# Patient Record
Sex: FEMALE | Race: WHITE | NOT HISPANIC OR LATINO | Employment: OTHER | URBAN - METROPOLITAN AREA
[De-identification: names, ages, dates, MRNs, and addresses within clinical notes are randomized per-mention and may not be internally consistent; named-entity substitution may affect disease eponyms.]

---

## 2017-05-20 ENCOUNTER — COMMUNICATION - HEALTHEAST (OUTPATIENT)
Dept: FAMILY MEDICINE | Facility: CLINIC | Age: 57
End: 2017-05-20

## 2018-01-23 ENCOUNTER — OFFICE VISIT - HEALTHEAST (OUTPATIENT)
Dept: FAMILY MEDICINE | Facility: CLINIC | Age: 58
End: 2018-01-23

## 2018-01-23 DIAGNOSIS — M85.80 OSTEOPENIA: ICD-10-CM

## 2018-01-23 DIAGNOSIS — R00.2 HEART PALPITATIONS: ICD-10-CM

## 2018-01-23 DIAGNOSIS — E78.5 HYPERLIPIDEMIA, UNSPECIFIED HYPERLIPIDEMIA TYPE: ICD-10-CM

## 2018-01-23 DIAGNOSIS — E55.9 VITAMIN D DEFICIENCY: ICD-10-CM

## 2018-01-23 DIAGNOSIS — Z00.00 ROUTINE GENERAL MEDICAL EXAMINATION AT A HEALTH CARE FACILITY: ICD-10-CM

## 2018-01-23 DIAGNOSIS — J45.20 MILD INTERMITTENT ASTHMA WITHOUT COMPLICATION: ICD-10-CM

## 2018-01-23 DIAGNOSIS — N95.2 ATROPHIC VAGINITIS: ICD-10-CM

## 2018-01-23 LAB
ALBUMIN SERPL-MCNC: 3.8 G/DL (ref 3.5–5)
ANION GAP SERPL CALCULATED.3IONS-SCNC: 8 MMOL/L (ref 5–18)
ATRIAL RATE - MUSE: 56 BPM
BUN SERPL-MCNC: 14 MG/DL (ref 8–22)
CALCIUM SERPL-MCNC: 9.5 MG/DL (ref 8.5–10.5)
CHLORIDE BLD-SCNC: 103 MMOL/L (ref 98–107)
CHOLEST SERPL-MCNC: 242 MG/DL
CO2 SERPL-SCNC: 29 MMOL/L (ref 22–31)
CREAT SERPL-MCNC: 0.77 MG/DL (ref 0.6–1.1)
DIASTOLIC BLOOD PRESSURE - MUSE: NORMAL MMHG
FASTING STATUS PATIENT QL REPORTED: YES
GFR SERPL CREATININE-BSD FRML MDRD: >60 ML/MIN/1.73M2
GLUCOSE BLD-MCNC: 97 MG/DL (ref 70–125)
HDLC SERPL-MCNC: 77 MG/DL
INTERPRETATION ECG - MUSE: NORMAL
LDLC SERPL CALC-MCNC: 148 MG/DL
MAGNESIUM SERPL-MCNC: 2.3 MG/DL (ref 1.8–2.6)
P AXIS - MUSE: 30 DEGREES
PHOSPHATE SERPL-MCNC: 3.4 MG/DL (ref 2.5–4.5)
POTASSIUM BLD-SCNC: 4.4 MMOL/L (ref 3.5–5)
PR INTERVAL - MUSE: 120 MS
QRS DURATION - MUSE: 72 MS
QT - MUSE: 452 MS
QTC - MUSE: 436 MS
R AXIS - MUSE: 35 DEGREES
SODIUM SERPL-SCNC: 140 MMOL/L (ref 136–145)
SYSTOLIC BLOOD PRESSURE - MUSE: NORMAL MMHG
T AXIS - MUSE: 8 DEGREES
TRIGL SERPL-MCNC: 86 MG/DL
TSH SERPL DL<=0.005 MIU/L-ACNC: 1.46 UIU/ML (ref 0.3–5)
VENTRICULAR RATE- MUSE: 56 BPM

## 2018-01-23 ASSESSMENT — MIFFLIN-ST. JEOR: SCORE: 1074.01

## 2018-01-24 LAB — 25(OH)D3 SERPL-MCNC: 33 NG/ML (ref 30–80)

## 2018-01-25 ENCOUNTER — COMMUNICATION - HEALTHEAST (OUTPATIENT)
Dept: FAMILY MEDICINE | Facility: CLINIC | Age: 58
End: 2018-01-25

## 2018-01-30 ENCOUNTER — HOSPITAL ENCOUNTER (OUTPATIENT)
Dept: CARDIOLOGY | Facility: CLINIC | Age: 58
Discharge: HOME | End: 2018-01-30
Attending: FAMILY MEDICINE

## 2018-01-30 DIAGNOSIS — R00.2 HEART PALPITATIONS: ICD-10-CM

## 2018-02-02 ENCOUNTER — COMMUNICATION - HEALTHEAST (OUTPATIENT)
Dept: FAMILY MEDICINE | Facility: CLINIC | Age: 58
End: 2018-02-02

## 2018-02-07 ENCOUNTER — RECORDS - HEALTHEAST (OUTPATIENT)
Dept: ADMINISTRATIVE | Facility: OTHER | Age: 58
End: 2018-02-07

## 2018-02-07 ENCOUNTER — RECORDS - HEALTHEAST (OUTPATIENT)
Dept: BONE DENSITY | Facility: CLINIC | Age: 58
End: 2018-02-07

## 2018-02-07 DIAGNOSIS — M85.80 OTHER SPECIFIED DISORDERS OF BONE DENSITY AND STRUCTURE, UNSPECIFIED SITE: ICD-10-CM

## 2018-02-09 ENCOUNTER — HOSPITAL ENCOUNTER (OUTPATIENT)
Dept: MAMMOGRAPHY | Facility: CLINIC | Age: 58
Discharge: HOME | End: 2018-02-09
Attending: FAMILY MEDICINE

## 2018-02-09 DIAGNOSIS — Z12.31 VISIT FOR SCREENING MAMMOGRAM: ICD-10-CM

## 2018-02-12 ENCOUNTER — COMMUNICATION - HEALTHEAST (OUTPATIENT)
Dept: MAMMOGRAPHY | Facility: CLINIC | Age: 58
End: 2018-02-12

## 2018-02-14 ENCOUNTER — HOSPITAL ENCOUNTER (OUTPATIENT)
Dept: ULTRASOUND IMAGING | Facility: CLINIC | Age: 58
Discharge: HOME | End: 2018-02-14
Attending: FAMILY MEDICINE

## 2018-02-14 ENCOUNTER — HOSPITAL ENCOUNTER (OUTPATIENT)
Dept: MAMMOGRAPHY | Facility: CLINIC | Age: 58
Discharge: HOME | End: 2018-02-14
Attending: FAMILY MEDICINE

## 2018-02-14 DIAGNOSIS — N64.89 BREAST ASYMMETRY: ICD-10-CM

## 2018-02-21 ENCOUNTER — COMMUNICATION - HEALTHEAST (OUTPATIENT)
Dept: FAMILY MEDICINE | Facility: CLINIC | Age: 58
End: 2018-02-21

## 2018-06-28 ENCOUNTER — COMMUNICATION - HEALTHEAST (OUTPATIENT)
Dept: FAMILY MEDICINE | Facility: CLINIC | Age: 58
End: 2018-06-28

## 2018-07-17 ENCOUNTER — OFFICE VISIT - HEALTHEAST (OUTPATIENT)
Dept: FAMILY MEDICINE | Facility: CLINIC | Age: 58
End: 2018-07-17

## 2018-07-17 DIAGNOSIS — L98.9 SKIN LESION: ICD-10-CM

## 2018-07-17 DIAGNOSIS — M65.30 TRIGGER FINGER, ACQUIRED: ICD-10-CM

## 2018-07-17 DIAGNOSIS — R22.9 LOCALIZED SUPERFICIAL SWELLING, MASS, OR LUMP: ICD-10-CM

## 2018-07-17 DIAGNOSIS — N95.2 ATROPHIC VAGINITIS: ICD-10-CM

## 2018-07-17 ASSESSMENT — MIFFLIN-ST. JEOR: SCORE: 1074.01

## 2018-07-19 ENCOUNTER — HOSPITAL ENCOUNTER (OUTPATIENT)
Dept: ULTRASOUND IMAGING | Facility: CLINIC | Age: 58
Discharge: HOME | End: 2018-07-19
Attending: FAMILY MEDICINE

## 2018-07-19 DIAGNOSIS — R22.9 LOCALIZED SUPERFICIAL SWELLING, MASS, OR LUMP: ICD-10-CM

## 2018-08-17 ENCOUNTER — COMMUNICATION - HEALTHEAST (OUTPATIENT)
Dept: FAMILY MEDICINE | Facility: CLINIC | Age: 58
End: 2018-08-17

## 2018-08-17 DIAGNOSIS — M65.342 TRIGGER RING FINGER OF LEFT HAND: ICD-10-CM

## 2018-08-21 ENCOUNTER — COMMUNICATION - HEALTHEAST (OUTPATIENT)
Dept: FAMILY MEDICINE | Facility: CLINIC | Age: 58
End: 2018-08-21

## 2018-08-21 DIAGNOSIS — M25.50 MULTIPLE JOINT PAIN: ICD-10-CM

## 2018-08-27 ENCOUNTER — COMMUNICATION - HEALTHEAST (OUTPATIENT)
Dept: FAMILY MEDICINE | Facility: CLINIC | Age: 58
End: 2018-08-27

## 2018-08-30 ENCOUNTER — RECORDS - HEALTHEAST (OUTPATIENT)
Dept: ADMINISTRATIVE | Facility: OTHER | Age: 58
End: 2018-08-30

## 2018-09-13 ENCOUNTER — RECORDS - HEALTHEAST (OUTPATIENT)
Dept: ADMINISTRATIVE | Facility: OTHER | Age: 58
End: 2018-09-13

## 2018-09-19 ENCOUNTER — RECORDS - HEALTHEAST (OUTPATIENT)
Dept: ADMINISTRATIVE | Facility: OTHER | Age: 58
End: 2018-09-19

## 2018-09-26 ENCOUNTER — RECORDS - HEALTHEAST (OUTPATIENT)
Dept: ADMINISTRATIVE | Facility: OTHER | Age: 58
End: 2018-09-26

## 2018-10-04 ENCOUNTER — OFFICE VISIT - HEALTHEAST (OUTPATIENT)
Dept: RHEUMATOLOGY | Facility: CLINIC | Age: 58
End: 2018-10-04

## 2018-10-04 DIAGNOSIS — M19.041 PRIMARY OSTEOARTHRITIS OF HANDS, BILATERAL: ICD-10-CM

## 2018-10-04 DIAGNOSIS — M25.50 POLYARTHRALGIA: ICD-10-CM

## 2018-10-04 DIAGNOSIS — M19.042 PRIMARY OSTEOARTHRITIS OF HANDS, BILATERAL: ICD-10-CM

## 2018-10-04 ASSESSMENT — MIFFLIN-ST. JEOR: SCORE: 1069.02

## 2018-10-08 ENCOUNTER — OFFICE VISIT - HEALTHEAST (OUTPATIENT)
Dept: FAMILY MEDICINE | Facility: CLINIC | Age: 58
End: 2018-10-08

## 2018-10-08 DIAGNOSIS — Z01.818 PREOPERATIVE EXAMINATION: ICD-10-CM

## 2018-10-08 DIAGNOSIS — M79.672 FOOT PAIN, LEFT: ICD-10-CM

## 2018-10-08 LAB
ATRIAL RATE - MUSE: 73 BPM
DIASTOLIC BLOOD PRESSURE - MUSE: NORMAL MMHG
HGB BLD-MCNC: 14.2 G/DL (ref 12–16)
INTERPRETATION ECG - MUSE: NORMAL
P AXIS - MUSE: 40 DEGREES
PR INTERVAL - MUSE: 118 MS
QRS DURATION - MUSE: 64 MS
QT - MUSE: 390 MS
QTC - MUSE: 429 MS
R AXIS - MUSE: 25 DEGREES
SYSTOLIC BLOOD PRESSURE - MUSE: NORMAL MMHG
T AXIS - MUSE: 8 DEGREES
VENTRICULAR RATE- MUSE: 73 BPM

## 2018-10-08 ASSESSMENT — MIFFLIN-ST. JEOR: SCORE: 1060.97

## 2018-10-16 ENCOUNTER — RECORDS - HEALTHEAST (OUTPATIENT)
Dept: ADMINISTRATIVE | Facility: OTHER | Age: 58
End: 2018-10-16

## 2018-10-23 ENCOUNTER — RECORDS - HEALTHEAST (OUTPATIENT)
Dept: ADMINISTRATIVE | Facility: OTHER | Age: 58
End: 2018-10-23

## 2018-11-26 ENCOUNTER — RECORDS - HEALTHEAST (OUTPATIENT)
Dept: ADMINISTRATIVE | Facility: OTHER | Age: 58
End: 2018-11-26

## 2018-12-10 ENCOUNTER — OFFICE VISIT - HEALTHEAST (OUTPATIENT)
Dept: FAMILY MEDICINE | Facility: CLINIC | Age: 58
End: 2018-12-10

## 2018-12-10 DIAGNOSIS — M79.672 LEFT FOOT PAIN: ICD-10-CM

## 2018-12-10 DIAGNOSIS — M65.342 TRIGGER RING FINGER OF LEFT HAND: ICD-10-CM

## 2018-12-10 DIAGNOSIS — Z01.818 ENCOUNTER FOR PREOPERATIVE EXAMINATION FOR GENERAL SURGICAL PROCEDURE: ICD-10-CM

## 2018-12-17 ENCOUNTER — RECORDS - HEALTHEAST (OUTPATIENT)
Dept: ADMINISTRATIVE | Facility: OTHER | Age: 58
End: 2018-12-17

## 2018-12-20 ENCOUNTER — RECORDS - HEALTHEAST (OUTPATIENT)
Dept: ADMINISTRATIVE | Facility: OTHER | Age: 58
End: 2018-12-20

## 2018-12-21 ENCOUNTER — RECORDS - HEALTHEAST (OUTPATIENT)
Dept: ADMINISTRATIVE | Facility: OTHER | Age: 58
End: 2018-12-21

## 2018-12-28 ENCOUNTER — RECORDS - HEALTHEAST (OUTPATIENT)
Dept: ADMINISTRATIVE | Facility: OTHER | Age: 58
End: 2018-12-28

## 2019-01-25 ENCOUNTER — RECORDS - HEALTHEAST (OUTPATIENT)
Dept: ADMINISTRATIVE | Facility: OTHER | Age: 59
End: 2019-01-25

## 2019-01-29 ENCOUNTER — OFFICE VISIT - HEALTHEAST (OUTPATIENT)
Dept: FAMILY MEDICINE | Facility: CLINIC | Age: 59
End: 2019-01-29

## 2019-01-29 ENCOUNTER — COMMUNICATION - HEALTHEAST (OUTPATIENT)
Dept: FAMILY MEDICINE | Facility: CLINIC | Age: 59
End: 2019-01-29

## 2019-01-29 DIAGNOSIS — J45.20 MILD INTERMITTENT ASTHMA WITHOUT COMPLICATION: ICD-10-CM

## 2019-01-29 DIAGNOSIS — Z01.818 PREOP GENERAL PHYSICAL EXAM: ICD-10-CM

## 2019-01-29 DIAGNOSIS — C44.91 SKIN CANCER, BASAL CELL: ICD-10-CM

## 2019-01-29 ASSESSMENT — MIFFLIN-ST. JEOR: SCORE: 1074.58

## 2019-02-17 ENCOUNTER — RECORDS - HEALTHEAST (OUTPATIENT)
Dept: ADMINISTRATIVE | Facility: OTHER | Age: 59
End: 2019-02-17

## 2019-02-25 ENCOUNTER — OFFICE VISIT - HEALTHEAST (OUTPATIENT)
Dept: FAMILY MEDICINE | Facility: CLINIC | Age: 59
End: 2019-02-25

## 2019-02-25 DIAGNOSIS — Z00.00 ROUTINE GENERAL MEDICAL EXAMINATION AT A HEALTH CARE FACILITY: ICD-10-CM

## 2019-02-25 DIAGNOSIS — N95.2 ATROPHIC VAGINITIS: ICD-10-CM

## 2019-02-25 DIAGNOSIS — M85.80 OSTEOPENIA, UNSPECIFIED LOCATION: ICD-10-CM

## 2019-02-25 DIAGNOSIS — J45.20 MILD INTERMITTENT ASTHMA WITHOUT COMPLICATION: ICD-10-CM

## 2019-02-25 DIAGNOSIS — E78.5 HYPERLIPIDEMIA, UNSPECIFIED HYPERLIPIDEMIA TYPE: ICD-10-CM

## 2019-02-25 DIAGNOSIS — Z12.4 SCREENING FOR MALIGNANT NEOPLASM OF CERVIX: ICD-10-CM

## 2019-02-25 DIAGNOSIS — M70.62 TROCHANTERIC BURSITIS OF LEFT HIP: ICD-10-CM

## 2019-02-25 LAB
CHOLEST SERPL-MCNC: 213 MG/DL
FASTING STATUS PATIENT QL REPORTED: YES
HDLC SERPL-MCNC: 66 MG/DL
LDLC SERPL CALC-MCNC: 134 MG/DL
TRIGL SERPL-MCNC: 67 MG/DL

## 2019-02-25 ASSESSMENT — MIFFLIN-ST. JEOR: SCORE: 1065.51

## 2019-02-26 LAB
HPV SOURCE: NORMAL
HUMAN PAPILLOMA VIRUS 16 DNA: NEGATIVE
HUMAN PAPILLOMA VIRUS 18 DNA: NEGATIVE
HUMAN PAPILLOMA VIRUS FINAL DIAGNOSIS: NORMAL
HUMAN PAPILLOMA VIRUS OTHER HR: NEGATIVE
SPECIMEN DESCRIPTION: NORMAL

## 2019-03-01 LAB
BKR LAB AP ABNORMAL BLEEDING: NO
BKR LAB AP BIRTH CONTROL/HORMONES: NORMAL
BKR LAB AP CERVICAL APPEARANCE: NORMAL
BKR LAB AP GYN ADEQUACY: NORMAL
BKR LAB AP GYN INTERPRETATION: NORMAL
BKR LAB AP HPV REFLEX: NORMAL
BKR LAB AP LMP: NORMAL
BKR LAB AP PATIENT STATUS: NORMAL
BKR LAB AP PREVIOUS ABNORMAL: NORMAL
BKR LAB AP PREVIOUS NORMAL: 2014
HIGH RISK?: NO
PATH REPORT.COMMENTS IMP SPEC: NORMAL
RESULT FLAG (HE HISTORICAL CONVERSION): NORMAL

## 2019-03-20 ENCOUNTER — COMMUNICATION - HEALTHEAST (OUTPATIENT)
Dept: FAMILY MEDICINE | Facility: CLINIC | Age: 59
End: 2019-03-20

## 2019-03-20 DIAGNOSIS — Z78.0 ASYMPTOMATIC POSTMENOPAUSAL STATUS: ICD-10-CM

## 2019-04-18 ENCOUNTER — OFFICE VISIT - HEALTHEAST (OUTPATIENT)
Dept: FAMILY MEDICINE | Facility: CLINIC | Age: 59
End: 2019-04-18

## 2019-04-18 DIAGNOSIS — S80.01XA CONTUSION OF RIGHT KNEE, INITIAL ENCOUNTER: ICD-10-CM

## 2019-04-18 DIAGNOSIS — S83.411A SPRAIN OF MEDIAL COLLATERAL LIGAMENT OF RIGHT KNEE, INITIAL ENCOUNTER: ICD-10-CM

## 2019-04-23 ENCOUNTER — COMMUNICATION - HEALTHEAST (OUTPATIENT)
Dept: FAMILY MEDICINE | Facility: CLINIC | Age: 59
End: 2019-04-23

## 2019-04-23 DIAGNOSIS — Z78.0 ASYMPTOMATIC POSTMENOPAUSAL STATUS: ICD-10-CM

## 2019-04-24 ENCOUNTER — AMBULATORY - HEALTHEAST (OUTPATIENT)
Dept: FAMILY MEDICINE | Facility: CLINIC | Age: 59
End: 2019-04-24

## 2019-04-24 DIAGNOSIS — Z78.0 ASYMPTOMATIC POSTMENOPAUSAL STATUS: ICD-10-CM

## 2019-04-25 ENCOUNTER — OFFICE VISIT - HEALTHEAST (OUTPATIENT)
Dept: FAMILY MEDICINE | Facility: CLINIC | Age: 59
End: 2019-04-25

## 2019-04-25 DIAGNOSIS — M25.561 ACUTE PAIN OF RIGHT KNEE: ICD-10-CM

## 2019-08-25 ENCOUNTER — OFFICE VISIT - HEALTHEAST (OUTPATIENT)
Dept: FAMILY MEDICINE | Facility: CLINIC | Age: 59
End: 2019-08-25

## 2019-08-25 DIAGNOSIS — S62.002A CLOSED NONDISPLACED FRACTURE OF SCAPHOID OF LEFT WRIST, UNSPECIFIED PORTION OF SCAPHOID, INITIAL ENCOUNTER: ICD-10-CM

## 2019-08-25 DIAGNOSIS — M25.532 LEFT WRIST PAIN: ICD-10-CM

## 2019-08-26 ENCOUNTER — RECORDS - HEALTHEAST (OUTPATIENT)
Dept: ADMINISTRATIVE | Facility: OTHER | Age: 59
End: 2019-08-26

## 2019-09-09 ENCOUNTER — RECORDS - HEALTHEAST (OUTPATIENT)
Dept: ADMINISTRATIVE | Facility: OTHER | Age: 59
End: 2019-09-09

## 2020-03-16 ENCOUNTER — COMMUNICATION - HEALTHEAST (OUTPATIENT)
Dept: FAMILY MEDICINE | Facility: CLINIC | Age: 60
End: 2020-03-16

## 2020-03-16 DIAGNOSIS — J45.20 MILD INTERMITTENT ASTHMA WITHOUT COMPLICATION: ICD-10-CM

## 2020-03-23 ENCOUNTER — OFFICE VISIT - HEALTHEAST (OUTPATIENT)
Dept: FAMILY MEDICINE | Facility: CLINIC | Age: 60
End: 2020-03-23

## 2020-03-23 DIAGNOSIS — R10.11 RUQ ABDOMINAL PAIN: ICD-10-CM

## 2020-03-23 RX ORDER — LANOLIN ALCOHOL/MO/W.PET/CERES
3 CREAM (GRAM) TOPICAL
Status: SHIPPED | COMMUNITY
Start: 2020-03-23 | End: 2022-09-07

## 2020-03-23 RX ORDER — VALACYCLOVIR HYDROCHLORIDE 1 G/1
TABLET, FILM COATED ORAL
Status: SHIPPED | COMMUNITY
Start: 2019-12-06 | End: 2023-05-02

## 2020-03-23 RX ORDER — LATANOPROST 50 UG/ML
SOLUTION/ DROPS OPHTHALMIC
Status: SHIPPED | COMMUNITY
Start: 2020-03-16 | End: 2021-09-01

## 2020-03-25 ENCOUNTER — HOSPITAL ENCOUNTER (OUTPATIENT)
Dept: CT IMAGING | Facility: CLINIC | Age: 60
Discharge: HOME | End: 2020-03-25
Attending: FAMILY MEDICINE

## 2020-03-25 ENCOUNTER — OFFICE VISIT - HEALTHEAST (OUTPATIENT)
Dept: FAMILY MEDICINE | Facility: CLINIC | Age: 60
End: 2020-03-25

## 2020-03-25 DIAGNOSIS — J45.20 MILD INTERMITTENT ASTHMA WITHOUT COMPLICATION: ICD-10-CM

## 2020-03-25 DIAGNOSIS — R10.11 RUQ ABDOMINAL PAIN: ICD-10-CM

## 2020-03-25 DIAGNOSIS — Z80.0 FAMILY HISTORY OF COLON CANCER: ICD-10-CM

## 2020-03-25 LAB
ALBUMIN SERPL-MCNC: 4.1 G/DL (ref 3.5–5)
ALP SERPL-CCNC: 83 U/L (ref 45–120)
ALT SERPL W P-5'-P-CCNC: 15 U/L (ref 0–45)
ANION GAP SERPL CALCULATED.3IONS-SCNC: 11 MMOL/L (ref 5–18)
AST SERPL W P-5'-P-CCNC: 19 U/L (ref 0–40)
BILIRUB SERPL-MCNC: 0.5 MG/DL (ref 0–1)
BUN SERPL-MCNC: 18 MG/DL (ref 8–22)
CALCIUM SERPL-MCNC: 9.6 MG/DL (ref 8.5–10.5)
CHLORIDE BLD-SCNC: 104 MMOL/L (ref 98–107)
CO2 SERPL-SCNC: 27 MMOL/L (ref 22–31)
CREAT BLD-MCNC: 0.8 MG/DL (ref 0.6–1.1)
CREAT SERPL-MCNC: 0.79 MG/DL (ref 0.6–1.1)
ERYTHROCYTE [DISTWIDTH] IN BLOOD BY AUTOMATED COUNT: 10.6 % (ref 11–14.5)
GFR SERPL CREATININE-BSD FRML MDRD: >60 ML/MIN/1.73M2
GFR SERPL CREATININE-BSD FRML MDRD: >60 ML/MIN/1.73M2
GLUCOSE BLD-MCNC: 95 MG/DL (ref 70–125)
HCT VFR BLD AUTO: 48.9 % (ref 35–47)
HGB BLD-MCNC: 16.5 G/DL (ref 12–16)
LIPASE SERPL-CCNC: 33 U/L (ref 0–52)
MCH RBC QN AUTO: 32.6 PG (ref 27–34)
MCHC RBC AUTO-ENTMCNC: 33.8 G/DL (ref 32–36)
MCV RBC AUTO: 96 FL (ref 80–100)
PLATELET # BLD AUTO: 193 THOU/UL (ref 140–440)
PMV BLD AUTO: 10.2 FL (ref 7–10)
POTASSIUM BLD-SCNC: 4.8 MMOL/L (ref 3.5–5)
PROT SERPL-MCNC: 7.4 G/DL (ref 6–8)
RBC # BLD AUTO: 5.07 MILL/UL (ref 3.8–5.4)
SODIUM SERPL-SCNC: 142 MMOL/L (ref 136–145)
WBC: 4.6 THOU/UL (ref 4–11)

## 2020-03-25 RX ORDER — ALBUTEROL SULFATE 90 UG/1
2 AEROSOL, METERED RESPIRATORY (INHALATION) EVERY 6 HOURS PRN
Qty: 1 EACH | Refills: 0 | Status: SHIPPED | OUTPATIENT
Start: 2020-03-25 | End: 2023-10-18

## 2020-05-20 ENCOUNTER — RECORDS - HEALTHEAST (OUTPATIENT)
Dept: ADMINISTRATIVE | Facility: OTHER | Age: 60
End: 2020-05-20

## 2020-06-01 ENCOUNTER — HOSPITAL ENCOUNTER (OUTPATIENT)
Dept: MAMMOGRAPHY | Facility: CLINIC | Age: 60
Discharge: HOME | End: 2020-06-01
Attending: FAMILY MEDICINE

## 2020-06-01 DIAGNOSIS — Z12.31 VISIT FOR SCREENING MAMMOGRAM: ICD-10-CM

## 2020-07-09 ENCOUNTER — OFFICE VISIT - HEALTHEAST (OUTPATIENT)
Dept: FAMILY MEDICINE | Facility: CLINIC | Age: 60
End: 2020-07-09

## 2020-07-09 DIAGNOSIS — R89.9 ABNORMAL LABORATORY TEST: ICD-10-CM

## 2020-07-09 DIAGNOSIS — H40.89 OTHER SPECIFIED GLAUCOMA, UNSPECIFIED LATERALITY: ICD-10-CM

## 2020-07-09 DIAGNOSIS — M85.80 OSTEOPENIA, UNSPECIFIED LOCATION: ICD-10-CM

## 2020-07-09 DIAGNOSIS — N95.2 ATROPHIC VAGINITIS: ICD-10-CM

## 2020-07-09 DIAGNOSIS — Z00.00 ROUTINE GENERAL MEDICAL EXAMINATION AT A HEALTH CARE FACILITY: ICD-10-CM

## 2020-07-09 DIAGNOSIS — E78.5 HYPERLIPIDEMIA, UNSPECIFIED HYPERLIPIDEMIA TYPE: ICD-10-CM

## 2020-07-09 DIAGNOSIS — J45.20 MILD INTERMITTENT ASTHMA WITHOUT COMPLICATION: ICD-10-CM

## 2020-07-09 DIAGNOSIS — Q79.0 BOCHDALEK HERNIA: ICD-10-CM

## 2020-07-09 LAB
CHOLEST SERPL-MCNC: 228 MG/DL
ERYTHROCYTE [DISTWIDTH] IN BLOOD BY AUTOMATED COUNT: 12.3 % (ref 11–14.5)
FASTING STATUS PATIENT QL REPORTED: YES
HCT VFR BLD AUTO: 45.5 % (ref 35–47)
HDLC SERPL-MCNC: 68 MG/DL
HGB BLD-MCNC: 15.6 G/DL (ref 12–16)
LDLC SERPL CALC-MCNC: 145 MG/DL
MCH RBC QN AUTO: 32.3 PG (ref 27–34)
MCHC RBC AUTO-ENTMCNC: 34.3 G/DL (ref 32–36)
MCV RBC AUTO: 94 FL (ref 80–100)
PLATELET # BLD AUTO: 165 THOU/UL (ref 140–440)
PMV BLD AUTO: 10.6 FL (ref 7–10)
RBC # BLD AUTO: 4.83 MILL/UL (ref 3.8–5.4)
TRIGL SERPL-MCNC: 77 MG/DL
WBC: 4.4 THOU/UL (ref 4–11)

## 2020-07-09 ASSESSMENT — MIFFLIN-ST. JEOR: SCORE: 1024.68

## 2020-07-13 ENCOUNTER — COMMUNICATION - HEALTHEAST (OUTPATIENT)
Dept: FAMILY MEDICINE | Facility: CLINIC | Age: 60
End: 2020-07-13

## 2020-07-13 DIAGNOSIS — Z82.3 FAMILY HISTORY OF STROKE: ICD-10-CM

## 2020-07-13 DIAGNOSIS — E78.5 HYPERLIPIDEMIA, UNSPECIFIED HYPERLIPIDEMIA TYPE: ICD-10-CM

## 2020-07-15 ENCOUNTER — RECORDS - HEALTHEAST (OUTPATIENT)
Dept: ADMINISTRATIVE | Facility: OTHER | Age: 60
End: 2020-07-15

## 2020-07-15 ENCOUNTER — RECORDS - HEALTHEAST (OUTPATIENT)
Dept: BONE DENSITY | Facility: CLINIC | Age: 60
End: 2020-07-15

## 2020-07-15 DIAGNOSIS — M85.80 OTHER SPECIFIED DISORDERS OF BONE DENSITY AND STRUCTURE, UNSPECIFIED SITE: ICD-10-CM

## 2020-07-17 ENCOUNTER — HOSPITAL ENCOUNTER (OUTPATIENT)
Dept: ULTRASOUND IMAGING | Facility: CLINIC | Age: 60
Discharge: HOME | End: 2020-07-17
Attending: FAMILY MEDICINE

## 2020-07-17 DIAGNOSIS — E78.5 HYPERLIPIDEMIA, UNSPECIFIED HYPERLIPIDEMIA TYPE: ICD-10-CM

## 2020-07-17 DIAGNOSIS — Z82.3 FAMILY HISTORY OF STROKE: ICD-10-CM

## 2020-07-20 ENCOUNTER — AMBULATORY - HEALTHEAST (OUTPATIENT)
Dept: FAMILY MEDICINE | Facility: CLINIC | Age: 60
End: 2020-07-20

## 2020-07-20 DIAGNOSIS — E78.5 HYPERLIPIDEMIA, UNSPECIFIED HYPERLIPIDEMIA TYPE: ICD-10-CM

## 2020-07-20 RX ORDER — ATORVASTATIN CALCIUM 10 MG/1
10 TABLET, FILM COATED ORAL DAILY
Qty: 90 TABLET | Refills: 3 | Status: SHIPPED | OUTPATIENT
Start: 2020-07-20 | End: 2021-08-10

## 2020-07-30 ENCOUNTER — COMMUNICATION - HEALTHEAST (OUTPATIENT)
Dept: FAMILY MEDICINE | Facility: CLINIC | Age: 60
End: 2020-07-30

## 2020-08-21 ENCOUNTER — COMMUNICATION - HEALTHEAST (OUTPATIENT)
Dept: FAMILY MEDICINE | Facility: CLINIC | Age: 60
End: 2020-08-21

## 2020-08-27 ENCOUNTER — OFFICE VISIT - HEALTHEAST (OUTPATIENT)
Dept: FAMILY MEDICINE | Facility: CLINIC | Age: 60
End: 2020-08-27

## 2020-08-27 DIAGNOSIS — H26.9 CATARACT, UNSPECIFIED CATARACT TYPE, UNSPECIFIED LATERALITY: ICD-10-CM

## 2020-08-27 DIAGNOSIS — E78.5 HYPERLIPIDEMIA, UNSPECIFIED HYPERLIPIDEMIA TYPE: ICD-10-CM

## 2020-08-27 DIAGNOSIS — D58.2 ELEVATED HEMOGLOBIN (H): ICD-10-CM

## 2020-08-27 DIAGNOSIS — Z01.818 PREOP GENERAL PHYSICAL EXAM: ICD-10-CM

## 2020-08-27 LAB
ALT SERPL W P-5'-P-CCNC: 19 U/L (ref 0–45)
CHOLEST SERPL-MCNC: 198 MG/DL
ERYTHROCYTE [DISTWIDTH] IN BLOOD BY AUTOMATED COUNT: 10.8 % (ref 11–14.5)
FASTING STATUS PATIENT QL REPORTED: YES
HCT VFR BLD AUTO: 47 % (ref 35–47)
HDLC SERPL-MCNC: 72 MG/DL
HGB BLD-MCNC: 15.7 G/DL (ref 12–16)
LDLC SERPL CALC-MCNC: 113 MG/DL
MCH RBC QN AUTO: 32 PG (ref 27–34)
MCHC RBC AUTO-ENTMCNC: 33.5 G/DL (ref 32–36)
MCV RBC AUTO: 96 FL (ref 80–100)
PLATELET # BLD AUTO: 199 THOU/UL (ref 140–440)
PMV BLD AUTO: 9.6 FL (ref 7–10)
RBC # BLD AUTO: 4.92 MILL/UL (ref 3.8–5.4)
TRIGL SERPL-MCNC: 64 MG/DL
WBC: 6.1 THOU/UL (ref 4–11)

## 2020-08-27 ASSESSMENT — MIFFLIN-ST. JEOR: SCORE: 1040.55

## 2020-08-27 NOTE — ASSESSMENT & PLAN NOTE
No contraindication to surgery.  Able to perform >4 metabolic equivalents.     - patient has considered antiemetics but would like to withhold for this procedure.  Symptoms have been mild at most in the past.

## 2020-10-06 ENCOUNTER — COMMUNICATION - HEALTHEAST (OUTPATIENT)
Dept: INTERNAL MEDICINE | Facility: CLINIC | Age: 60
End: 2020-10-06

## 2020-10-06 DIAGNOSIS — Z23 NEED FOR SHINGLES VACCINE: ICD-10-CM

## 2020-10-08 ENCOUNTER — AMBULATORY - HEALTHEAST (OUTPATIENT)
Dept: NURSING | Facility: CLINIC | Age: 60
End: 2020-10-08

## 2020-10-08 DIAGNOSIS — Z23 NEED FOR SHINGLES VACCINE: ICD-10-CM

## 2020-10-08 DIAGNOSIS — Z23 NEED FOR INFLUENZA VACCINATION: ICD-10-CM

## 2020-12-02 ENCOUNTER — RECORDS - HEALTHEAST (OUTPATIENT)
Dept: ADMINISTRATIVE | Facility: OTHER | Age: 60
End: 2020-12-02

## 2021-01-12 ENCOUNTER — RECORDS - HEALTHEAST (OUTPATIENT)
Dept: ADMINISTRATIVE | Facility: OTHER | Age: 61
End: 2021-01-12

## 2021-02-01 ENCOUNTER — AMBULATORY - HEALTHEAST (OUTPATIENT)
Dept: FAMILY MEDICINE | Facility: CLINIC | Age: 61
End: 2021-02-01

## 2021-02-01 DIAGNOSIS — Z78.0 ASYMPTOMATIC POSTMENOPAUSAL STATUS: ICD-10-CM

## 2021-02-01 RX ORDER — CONJUGATED ESTROGENS 0.62 MG/G
CREAM VAGINAL
Qty: 42.5 G | Refills: 2 | Status: SHIPPED | OUTPATIENT
Start: 2021-02-01 | End: 2021-09-01

## 2021-05-26 NOTE — TELEPHONE ENCOUNTER
RN cannot approve Refill Request    RN can NOT refill this medication pt request to be faxed to [facility].       Theresa Neal, [facility] Triage/Med Refill 3/22/2019    Requested Prescriptions   Pending Prescriptions Disp Refills     conjugated estrogens (PREMARIN) vaginal cream 42.5 g 0     Sig: Insert into the vagina daily. Pt unaware of dosage    Hormone Replacement Therapy Refill Protocol Passed - 3/20/2019  6:03 PM       Passed - PCP or prescribing provider visit in past 12 months      Last office visit with prescriber/PCP: 7/17/2018 Jeny Baron MD OR same dept: 7/17/2018 Jeny Baron MD OR same specialty: 7/17/2018 Jeny Baron MD  Last physical: 2/25/2019 Last MTM visit: Visit date not found     Next visit within 3 mo: Visit date not found  Next physical within 3 mo: Visit date not found  Prescriber OR PCP: Jeny Baron MD  Last diagnosis associated with med order: There are no diagnoses linked to this encounter.   If protocol passes may refill for 3 months.

## 2021-05-28 ASSESSMENT — ASTHMA QUESTIONNAIRES: ACT_TOTALSCORE: 23

## 2021-05-28 NOTE — PROGRESS NOTES
Subjective:   Lela Beckwith is a(n) 58 y.o. White or  female who presents to [facility] with the following complaint(s):  Knee Injury (04/18/2019 work comp, right knee, fell face first)    History of Present Illness:  Work related injury: Yes  Date of injury: 4/18/2019  Time of injury: 1530  Employer: [employer] [place] [address]  Job title: Nurse  Mechanism of injury: Was walking and tripped on an inflatable ball. Caught her left foot on the ball and fell forward, landing on her hands and right knee. Right knee is painful. Hands are not. Did not strike her head. No loss of consciousness. No other injuries.   Symptoms: Right knee is painful and mildly swollen. Has been able to ambulate but notes a catch or click over the front of the knee with flexion. Pain is worse with climbing stairs. No history of right knee injury or surgery. Has been icing the knee and has it wrapped with an ACE wrap.     The following portions of the patient's history were reviewed and updated as appropriate: allergies, current medications, past family history, past medical history, past social history, past surgical history and problem list.    Review of Systems:   Review of Systems   All other systems reviewed and are negative.    Objective:     Vitals:    04/18/19 1801   BP: 122/77   Patient Site: Right Arm   Patient Position: Sitting   Cuff Size: Adult Regular   Pulse: 66   Resp: 16   Temp: 98.1  F (36.7  C)   TempSrc: Oral   SpO2: 97%   Weight: 130 lb (59 kg)     Physical Exam   Constitutional: She is oriented to person, place, and time. She appears well-developed and well-nourished.  Non-toxic appearance. No distress.   Cardiovascular: Normal rate, regular rhythm, S1 normal and S2 normal. Exam reveals no gallop and no friction rub.   No murmur heard.  Pulmonary/Chest: Effort normal and breath sounds normal. No stridor. She has no wheezes. She has no rhonchi. She has no rales.   Musculoskeletal:        Right knee: She  exhibits swelling (mild, over the anteromedial knee) and ecchymosis (mild, over the anteromedial knee). She exhibits normal range of motion, no effusion, no deformity, no laceration, no erythema, normal alignment, no LCL laxity, normal patellar mobility, no bony tenderness, normal meniscus and no MCL laxity. Tenderness found. MCL tenderness noted. No medial joint line, no lateral joint line, no LCL and no patellar tendon tenderness noted.   Lachman's negative. Varus stress testing negative. Valgus stress testing positive for pain but negative for laxity / instability.    Neurological: She is alert and oriented to person, place, and time. She has normal strength. No cranial nerve deficit or sensory deficit.   Skin: Skin is warm and dry. No rash noted. No pallor.   Nursing note and vitals reviewed.    Laboratory:  N/A    Radiology:  N/A    Electrocardiogram:  N/A    Assessment/Plan   1. Contusion of right knee, initial encounter    2. Sprain of medial collateral ligament of right knee, initial encounter    - Knee examination is consistent with contusion and MCL sprain without evidence of bony injury. X-rays therefore not completed. Discussed management with rest, ice, compression, elevation, and analgesics. Workability form completed (see scanned documents).   - Counseled patient regarding assessment and plan for evaluation and treatment. Questions were answered. See AVS for the specific written instructions and educational handout(s) regarding contusion and MCL sprain that were provided at the conclusion of the visit.   - Discussed signs / symptoms that warrant urgent / emergent medical attention.   - Follow up with Primary Care for recheck and revision of workability form in 1 week.     Hemant Mike MD

## 2021-05-28 NOTE — PROGRESS NOTES
Office Visit - Follow Up   Lela Beckwith   58 y.o. female    Date of Visit: 4/25/2019    Chief Complaint   Patient presents with     Follow-up     Follow up for work comp. Still having some Knee pain, mainly caused by touch and still having a hard time walking up stairs        Assessment and Plan   1. Acute pain of right knee  Symptoms have improved.  Recommend the patient continue with rest, ice, heat, compression and elevation and follow-up if symptom worsens.       History of Present Illness   This 58 y.o. old female patient who presented to the clinic for follow-up after she was seen at the walk-in clinic a week ago.  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  She then reported to the walk-in clinic where she was evaluated for fracture but was discovered to have contusion of her right knee and sprained of the medial collateral ligament of the right knee.  She was instructed to manage her symptoms with rest, ice, compression, elevation and analgesic.  Returns today stating that she is feeling much better.  She has no other concerns today.    Review of Systems: A comprehensive review of systems was negative except as noted.     Medications, Allergies and Problem List   Reviewed and updated     Physical Exam   General Appearance: Well groomed    /63 (Patient Site: Right Arm, Patient Position: Sitting, Cuff Size: Adult Regular)   Pulse 73   Wt 127 lb 9.6 oz (57.9 kg)   SpO2 96%   BMI 24.51 kg/m    Right knee: She exhibits no swelling no ecchymosis. She exhibits normal range of motion, no effusion, no deformity, no laceration, no erythema, normal alignment, no LCL laxity, normal patellar mobility, no bony tenderness, normal meniscus and no MCL laxity.        Additional Information   Current Outpatient Medications   Medication Sig Dispense Refill     calcium carbonate (OS-DIMITRIS) 600 mg calcium (1,500 mg) tablet Take 600 mg by mouth 2 (two) times a day with meals.       cholecalciferol,  vitamin D3, 5,000 unit Tab Take by mouth.       conjugated estrogens (PREMARIN) vaginal cream Insert 0.5 grams twice weekly into vagina 42.5 g 2     multivitamin (ONE A DAY) per tablet Take 1 tablet by mouth daily.       UNABLE TO FIND Med Name: Face treatment for pre cancer cells on face       ALBUTEROL INHL Inhale.       vit B comp no.3-folic-C-biotin (NEPHRO-YNES) 1- mg-mg-mcg Tab tablet Take 1 tablet by mouth daily.       vitamin A-vitamin C-vit E-min (OCUVITE) Tab tablet Take by mouth daily.       No current facility-administered medications for this visit.      No Known Allergies  Social History     Tobacco Use     Smoking status: Former Smoker     Smokeless tobacco: Never Used   Substance Use Topics     Alcohol use: Not on file     Drug use: Not on file       Time: total time spent with the patient was 15 minutes of which >50% was spent in counseling and coordination of care     Cindy Villatoro, CNP

## 2021-05-28 NOTE — TELEPHONE ENCOUNTER
Medication Question or Clarification  Who is calling: Pharmacy: [facility]  What medication are you calling about? (include dose and sig)  Premarin  Who prescribed the medication?:  martin  What is your question/concern?:  They need to know how many grams to insert into the vagina  Pharmacy:  [facility]  [phone]  Okay to leave a detailed message?: Yes  Site CMT - Please call the pharmacy to obtain any additional needed information.

## 2021-05-31 VITALS — HEIGHT: 61 IN | WEIGHT: 128 LBS | BODY MASS INDEX: 24.17 KG/M2

## 2021-06-01 ENCOUNTER — RECORDS - HEALTHEAST (OUTPATIENT)
Dept: ADMINISTRATIVE | Facility: CLINIC | Age: 61
End: 2021-06-01

## 2021-06-01 VITALS — BODY MASS INDEX: 24.17 KG/M2 | WEIGHT: 128 LBS | HEIGHT: 61 IN

## 2021-06-02 ENCOUNTER — RECORDS - HEALTHEAST (OUTPATIENT)
Dept: FAMILY MEDICINE | Facility: CLINIC | Age: 61
End: 2021-06-02

## 2021-06-02 VITALS — HEIGHT: 61 IN | WEIGHT: 127 LBS | BODY MASS INDEX: 23.98 KG/M2

## 2021-06-02 VITALS — HEIGHT: 61 IN | WEIGHT: 126.9 LBS | BODY MASS INDEX: 23.96 KG/M2

## 2021-06-02 VITALS — WEIGHT: 126 LBS | HEIGHT: 61 IN | BODY MASS INDEX: 23.79 KG/M2

## 2021-06-02 VITALS — WEIGHT: 129 LBS | BODY MASS INDEX: 24.35 KG/M2 | HEIGHT: 61 IN

## 2021-06-02 VITALS — BODY MASS INDEX: 24.47 KG/M2 | WEIGHT: 127.4 LBS

## 2021-06-02 DIAGNOSIS — Z12.31 VISIT FOR SCREENING MAMMOGRAM: ICD-10-CM

## 2021-06-03 ENCOUNTER — RECORDS - HEALTHEAST (OUTPATIENT)
Dept: ADMINISTRATIVE | Facility: CLINIC | Age: 61
End: 2021-06-03

## 2021-06-03 VITALS — WEIGHT: 127.6 LBS | BODY MASS INDEX: 24.51 KG/M2

## 2021-06-03 VITALS — BODY MASS INDEX: 24.97 KG/M2 | WEIGHT: 130 LBS

## 2021-06-03 VITALS — BODY MASS INDEX: 24.99 KG/M2 | WEIGHT: 130.1 LBS

## 2021-06-04 VITALS
TEMPERATURE: 97.8 F | HEIGHT: 62 IN | BODY MASS INDEX: 21.71 KG/M2 | OXYGEN SATURATION: 98 % | RESPIRATION RATE: 16 BRPM | WEIGHT: 118 LBS | HEART RATE: 76 BPM | SYSTOLIC BLOOD PRESSURE: 112 MMHG | DIASTOLIC BLOOD PRESSURE: 64 MMHG

## 2021-06-04 VITALS
SYSTOLIC BLOOD PRESSURE: 128 MMHG | DIASTOLIC BLOOD PRESSURE: 74 MMHG | BODY MASS INDEX: 24.01 KG/M2 | HEART RATE: 70 BPM | WEIGHT: 125 LBS

## 2021-06-04 VITALS
WEIGHT: 118 LBS | HEART RATE: 61 BPM | BODY MASS INDEX: 22.28 KG/M2 | OXYGEN SATURATION: 98 % | HEIGHT: 61 IN | DIASTOLIC BLOOD PRESSURE: 72 MMHG | SYSTOLIC BLOOD PRESSURE: 122 MMHG

## 2021-06-06 NOTE — TELEPHONE ENCOUNTER
Medication Request  Medication name:   ALBUTEROL INHL      --    Sig - Route: Inhale. - Inhalation    Class: Historical Med      Requested Pharmacy: [facility]  Reason for request: Refill requested for above medication, medication is listed as historical.   When did you use medication last?:  Last fill 2 years ago  Patient offered appointment:  patient declined  Okay to leave a detailed message: yes

## 2021-06-07 NOTE — PROGRESS NOTES
"Lela Beckwith is a 59 y.o. female who is being evaluated via a billable telephone visit.      The patient has been notified of following:     \"This telephone visit will be conducted via a call between you and your physician/provider. We have found that certain health care needs can be provided without the need for a physical exam.  This service lets us provide the care you need with a short phone conversation.  If a prescription is necessary we can send it directly to your pharmacy.  If lab work is needed we can place an order for that and you can then stop by our lab to have the test done at a later time.    If during the course of the call the physician/provider feels a telephone visit is not appropriate, you will not be charged for this service.\"         Lela Beckwith complains of    Chief Complaint   Patient presents with     Osteopenia     Hip Pain     discuss chol     upper abdominal pain     History: Lela is a 59-year-old female who was originally scheduled for an annual physical exam today but due to the coronavirus pandemic, we advised her to reschedule.  However, she stated that she had a few concerns she wanted to have addressed.  The main concern is regarding a persistent low-grade discomfort in her right upper abdomen for the past 2 months.  The patient describes it as a constant sensation but that it really does not seem to interfere with sleep.  It does not seem to get any better or worse with eating.  She states that it is very mild but that it just does not go away.  She would describe it as almost a gas bubble that is up under her ribs.      I have reviewed and updated the patient's Past Medical History, Social History, Family History and Medication List.    ALLERGIES  Patient has no known allergies.      Assessment/Plan:  1. RUQ abdominal pain  I advised that the patient make an appointment to actually be seen this week.  I am concerned regarding persistent and undiagnosed abdominal pain although it " is mild.  I do think an examination and some at minimum lab testing would be indicated.  The patient was comfortable with that plan and a follow-up appointment was made for this week.        Phone call duration:  6 minutes    Jeny Baron MD

## 2021-06-09 NOTE — TELEPHONE ENCOUNTER
Discussed with patient and discussed risks/benefit of aspirin as well as concern about cholesterol

## 2021-06-09 NOTE — PROGRESS NOTES
Assessment/Plan:      Healthy female exam.   Problem List Items Addressed This Visit        [facility] Cardiac Problems    Hyperlipidemia     Due for lipid panel.         Relevant Orders    Lipid Profile    Mild intermittent asthma     Stable and well-controlled with an ACT score of 23 today.         Bochdalek hernia     Incidentally found and not of clinical significance            Other    Osteopenia     Due for bone density test which was ordered today.         Relevant Orders    DXA Bone Density Scan    Atrophic vaginitis     Continues on Premarin cream which does help although she continues to have some symptoms.         Other specified glaucoma      Other Visit Diagnoses     Routine general medical examination at a health care facility    -  Primary    Abnormal laboratory test        Relevant Orders    HM2(CBC w/o Differential) (Completed)               Subjective:      Lela Beckwith is a 60 y.o. female who presents for an annual exam.  The patient overall feels well and really has no specific complaints or concerns at today's visit.    Healthy Habits:   Regular Exercise: Yes  Sunscreen Use: Yes  Healthy Diet: Yes  Dental Visits Regularly: Yes  Seat Belt: Yes  Sexually active: Yes  Colon cancer screening: Due next in 2023  Lipid Profile: Yes  Glucose Screen: N/A  Prevention of Osteoporosis: Yes  Last Dexa: Due now; order placed      Immunization History   Administered Date(s) Administered     Hep A, Adult IM (19yr & older) 10/25/2005, 10/28/2005, 04/11/2007     Hep A, historic 10/25/2005, 04/11/2007     Influenza Z3q4-21, 12/23/2009     Influenza, Seasonal, Inj PF IIV3 01/08/2013     Influenza, inj, historic,unspecified 09/29/2015, 09/12/2017, 09/25/2018, 09/17/2019     Influenza, seasonal,quad inj 6-35 mos 10/01/2009     Td, adult adsorbed, PF 02/25/2019     Td,adult,historic,unspecified 03/02/2009     Tdap 03/02/2009     ZOSTER, LIVE 03/07/2016     ZOSTER, RECOMBINANT, IM 07/09/2020     Immunization status:  due today.    Gynecologic History  No LMP recorded. Patient is postmenopausal.  Contraception: post menopausal status  Last Pap: . Results were: normal  Last mammogram: . Results were: normal      OB History    Para Term  AB Living   2 2 2         SAB TAB Ectopic Multiple Live Births                  # Outcome Date GA Lbr Matthew/2nd Weight Sex Delivery Anes PTL Lv   2 Term            1 Term                Current Outpatient Medications   Medication Sig Dispense Refill     albuterol (PROAIR HFA;PROVENTIL HFA;VENTOLIN HFA) 90 mcg/actuation inhaler Inhale 2 puffs every 6 (six) hours as needed for wheezing. 1 each 0     calcium carbonate (OS-DIMITRIS) 600 mg calcium (1,500 mg) tablet Take 600 mg by mouth 2 (two) times a day with meals.       cholecalciferol, vitamin D3, 5,000 unit Tab Take by mouth.       conjugated estrogens (PREMARIN) vaginal cream Insert 0.5 grams twice weekly into vagina 42.5 g 2     latanoprost (XALATAN) 0.005 % ophthalmic solution INT 1 GTT IN OU QD IN THE BRUCE       melatonin 3 mg Tab tablet Take 3 mg by mouth at bedtime as needed. 3-5 mg       UNABLE TO FIND Med Name: Face treatment for pre cancer cells on face       valACYclovir (VALTREX) 1000 MG tablet TK 2 TS PO Q 12 H FOR 1 DAY AT ONSET OF SYMPTOMS       zinc sulfate (ZINC-15 ORAL) Take by mouth.       No current facility-administered medications for this visit.      Past Medical History:   Diagnosis Date     Atrophic vaginitis 2018     Chronic Reflux Esophagitis     Created by Conversion      Fatty Liver     Created by Conversion      Hyperlipidemia     Created by Conversion      Hyperplastic Polyp Of The Large Intestine     Created by Conversion      Leiomyoma Of The Uterus     Created by Conversion      Menopause Has Occurred     Created by Conversion  Replacement Utility updated for latest IMO load     Mild intermittent asthma     Created by [employer] [facility] Annotation: Mar 30 2010 11:09AM Karo Tineo:  excercise induced      Osteopenia 3/7/2016     Polyarthralgia 10/4/2018     Primary osteoarthritis of hands, bilateral 10/4/2018     Past Surgical History:   Procedure Laterality Date     [place]  DELIVERY ONLY      Description:  Section;  Recorded: 2009;  Comments: X 2     RHINOPLASTY       Patient has no known allergies.  Family History   Problem Relation Age of Onset     Breast cancer Maternal Aunt      Cancer Maternal Uncle      Cancer Maternal Uncle      Colon cancer Sister 69        BRAF mutation     Breast cancer Maternal Aunt      Breast cancer Maternal Aunt      Breast cancer Maternal Aunt      Social History     Socioeconomic History     Marital status:      Spouse name: Not on file     Number of children: Not on file     Years of education: Not on file     Highest education level: Not on file   Occupational History     Not on file   Social Needs     Financial resource strain: Not on file     Food insecurity     Worry: Not on file     Inability: Not on file     Transportation needs     Medical: Not on file     Non-medical: Not on file   Tobacco Use     Smoking status: Former Smoker     Smokeless tobacco: Never Used   Substance and Sexual Activity     Alcohol use: Not on file     Drug use: Not on file     Sexual activity: Not on file   Lifestyle     Physical activity     Days per week: Not on file     Minutes per session: Not on file     Stress: Not on file   Relationships     Social connections     Talks on phone: Not on file     Gets together: Not on file     Attends Sabianism service: Not on file     Active member of club or organization: Not on file     Attends meetings of clubs or organizations: Not on file     Relationship status: Not on file     Intimate partner violence     Fear of current or ex partner: Not on file     Emotionally abused: Not on file     Physically abused: Not on file     Forced sexual activity: Not on file   Other Topics Concern     Not on file   Social  "History Narrative     Not on file       Review of Systems  General:  Denies problem  Eyes: Denies problem  Ears/Nose/Throat: Denies problem  Cardiovascular: Denies problem  Respiratory:  Denies problem  Gastrointestinal:  Denies problem, Genitourinary: Denies problem  Musculoskeletal:  Denies problem  Skin: Denies problem  Neurologic: Denies problem  Psychiatric: Denies problem  Endocrine: Denies problem  Heme/Lymphatic: Denies problem   Allergic/Immunologic: Denies problem        Objective:         Vitals:    07/09/20 0727   BP: 122/72   Pulse: 61   SpO2: 98%   Weight: 118 lb (53.5 kg)   Height: 5' 0.5\" (1.537 m)     Body mass index is 22.67 kg/m .    Physical Exam:  General Appearance: Alert, cooperative, no distress, appears stated age  Head: Normocephalic, without obvious abnormality, atraumatic  Eyes: PERRL, conjunctiva/corneas clear, EOM's intact  Ears: Normal TM's and external ear canals, both ears  Nose: Nares normal, septum midline,mucosa normal, no drainage  Throat: Lips, mucosa, and tongue normal; teeth and gums normal  Neck: Supple, symmetrical, trachea midline, no adenopathy;  thyroid: not enlarged, symmetric, no tenderness/mass/nodules; no carotid bruit or JVD  Back: Symmetric, no curvature, ROM normal, no CVA tenderness  Lungs: Clear to auscultation bilaterally, respirations unlabored  Breasts: No breast masses, tenderness, asymmetry, or nipple discharge.  Heart: Regular rate and rhythm, S1 and S2 normal, no murmur, rub, or gallop, Abdomen: Soft, non-tender, bowel sounds active all four quadrants,  no masses, no organomegaly  Pelvic:Normal bimanual exam  Extremities: Extremities normal, atraumatic, no cyanosis or edema  Skin: Skin color, texture, turgor normal, no rashes or lesions  Lymph nodes: Cervical, supraclavicular, and axillary nodes normal  Neurologic: Normal        "

## 2021-06-10 NOTE — TELEPHONE ENCOUNTER
Left message to call back for: pre-op  Information to relay to patient:  Ok to see anybody for pre-op or call dhruv at [place] to get her scheduled

## 2021-06-10 NOTE — TELEPHONE ENCOUNTER
Patient Returning Call  Reason for call:  Patient called back.  Information relayed to patient:  Informed of message listed below.  Patient was transferred to [facility] per message.  Patient has additional questions:  No  If YES, what are your questions/concerns:    Okay to leave a detailed message?: No call back needed

## 2021-06-10 NOTE — PATIENT INSTRUCTIONS - HE
"TAKE ALL MEDICATIONS AS YOU NORMALLY WOULD.     Preparing for Your Surgery  Getting started  A surgery nurse will call you to review your health history and instructions. They will give you an arrival time based on your scheduled surgery time.  Please be ready to share the following:    Your doctor's clinic name and phone number    Your medical, surgical and anesthesia history    A list of allergies and sensitivities    A list of medicines, including herbal treatments and over-the-counter drugs    Whether the patient has a legal guardian (ask how to send us the papers in advance)  If your child is having surgery, please ask for a copy of Preparing for Your Child's Surgery.    Preparing for surgery    Within 30 days of surgery: Have an exam at your family clinic (primary care clinic), or go to a pre-operative clinic. This exam is called a \"History and Physical,\" or H&P.    At your H&P exam, talk to your care team about all medicines you take. If you need to stop any medicines before surgery, ask when to start taking them again.  ? We do this for your safety. Many medicines can make you bleed too much during surgery. Some change how well surgery (anesthesia) drugs work.    Call your insurance company to see what it will and won't pay for. Ask if they need to pre-approve the surgery. (If no insurance, call [phone].)    Call your surgeon's clinic if there's any change in your health. This includes signs of a cold or flu (sore throat, runny nose, cough, rash, fever). It also includes a scrape or scratch near the surgery site.    If you have questions on the day of surgery, call your surgery center.  Eating and drinking guidelines  For your safety: Unless your surgeon tells you otherwise, follow the guidelines below.    Eat and drink as usual until 8 hours before surgery. After that, no food or milk.    Drink clear liquids until 2 hours before surgery. These are liquids you can see through, like water, Gatorade and " Propel Water. You may also have black coffee and tea (no cream or milk).    Nothing by mouth within 2 hours of surgery. This includes gum, candy and breath mints.    Stop alcohol the midnight before surgery.    If your family clinic tells you to take medicine on the morning of surgery, it's okay to take it with a sip of water.  Preventing infection    Shower or bathe the night before and morning of your surgery. Follow the instructions your clinic gave you. (If no instructions, use regular soap.)    Don't shave or clip hair near your surgery site. This can lead to skin infection.    Don't smoke the morning of surgery. Smoking increases the risk of infection. You may chew nicotine gum up to 2 hours before surgery. A nicotine patch is okay.  ? Note: Some surgeries require you to completely quit smoking and nicotine. Check with your surgeon.    Your care team will make every effort to keep you safe from infection. We will:  ? Clean our hands often with soap and water (or an alcohol-based hand rub).  ? Clean the skin at your surgery site with a special soap that kills germs. We'll also remove hair from the site as needed.  ? Wear special hair covers, masks, gowns and gloves during surgery.  ? Give antibiotic medicine, if prescribed. Not all surgeries need antibiotics.  What to bring on the day of surgery    Photo ID and insurance card    Copy of your health care directive, if you have one    Glasses and hearing aides (bring cases)  ? You can't wear contacts during surgery    Inhaler and eye drops, if you use them (tell us about these when you arrive)    CPAP machine or breathing device, if you use them    A few personal items, if spending the night    If you have . . .  ? A pacemaker or ICD (cardiac defibrillator): Bring the ID card.  ? An implanted stimulator: Bring the remote control.  ? A legal guardian: Bring a copy of the certified (court-stamped) guardianship papers.  Please remove any jewelry, including body  piercings. Leave jewelry and other valuables at home.  If you're going home the day of surgery  Important: If you don't follow the rules below, we must cancel your surgery.     Arrange for someone to drive you home after surgery. You may not drive, take a taxi or take public transportation by yourself (unless you'll have local anesthesia only).    Arrange for a responsible adult to stay with you overnight. If you don't, we may keep you in the hospital overnight, and you may need to pay the costs yourself.  Questions?   If you have any questions for your care team, list them here: _________________________________________________________________________________________________________________________________________________________________________________________________________________________________________________________________________________________________________________________  For informational purposes only. Not to replace the advice of your health care provider. Copyright   3597-5793 [facility]. All rights reserved. Clinically reviewed by Gaby Frey MD. SMART[employer] [id number] - REV 07/19.

## 2021-06-10 NOTE — TELEPHONE ENCOUNTER
New Appointment Needed  What is the reason for the visit:    Pre-Op Appt Request  When is the surgery? :  09/02/20    Where is the surgery?:   [facility]    Who is the surgeon? :  Dr. Rodriguez    What type of surgery is being done?: Cataract Surgery    Provider Preference: PCP only     How soon do you need to be seen?: 09/26, 09/27 or 09/28    Waitlist offered?: No     Okay to leave a detailed message:  Yes      Patient is also asking to do her labs in chart this same day as well as her shingles vaccine.  Please call patient with scheduling options.

## 2021-06-10 NOTE — PROGRESS NOTES
[facility]  [address]  [facility] [address]  Dept: [phone]  Dept Fax: [phone]  Primary Provider: Jeny Baron MD  Pre-op Performing Provider: DIVYA BRYAN    PREOPERATIVE EVALUATION:  Today's date: 8/27/2020    Lela Beckwith is a 60 y.o. female who presents for a preoperative evaluation.    Surgical Information:  Surgery Details 8/25/2020   Surgery/Procedure: cataract removal and lens replacement    Surgery Location: [facility] [place]   Surgeon: Dr Contreras   Surgery Date: 9.2.2020 (Left), 9.16.2020 (Right)   Time of Surgery: same day   Where patient plans to recover: at home with family     Fax number for surgical facility: TBD  Type of Anesthesia Anticipated: Local with MAC    Subjective     HPI related to upcoming procedure: Patient has noticed that her vision has been increasingly cloudy over the past couple of years.  She is also struggling with focus/clarity of vision.  This procedure was initially scheduled for earlier this year but was delayed due to COVID-19.  Symptoms have been gradually worsening.       Preop Questions 8/25/2020   Have you ever had a heart attack or stroke? No   Have you ever had surgery on your heart or blood vessels, such as a stent placement, a coronary artery bypass, or surgery on an artery in your head, neck, heart, or legs? No   Do you have chest pain with activity? No   Do you have a history of  heart failure? No   Do you currently have a cold, bronchitis or symptoms of other infection? No   Do you have a cough, shortness of breath, or wheezing? No   Do you or anyone in your family have previous history of blood clots? No   Do you or does anyone in your family have a serious bleeding problem such as prolonged bleeding following surgeries or cuts? No   Have you ever had problems with anemia or been told to take iron pills? No   Have you had any abnormal blood loss such as black, tarry or bloody stools, or abnormal vaginal  bleeding? No   Have you ever had a blood transfusion? No   Are you willing to have a blood transfusion if it is medically needed before, during, or after your surgery? Yes   Have you or any of your relatives ever had problems with anesthesia? No   Do you have sleep apnea, excessive snoring or daytime drowsiness? No   Do you have any artifical heart valves or other implanted medical devices like a pacemaker, defibrillator, or continuous glucose monitor? No   Do you have artificial joints? No   Are you allergic to latex? No   Is there any chance that you may be pregnant? No       RX [place] ([place]) reviewed:  reviewed - no concerns    See problem list for active medical problems.  Problems all longstanding and stable, except as noted/documented.  See ROS for pertinent symptoms related to these conditions.    Review of Systems   Constitutional: Negative.    HENT: Negative.    Eyes: Negative.    Respiratory: Negative.    Cardiovascular: Negative.    Gastrointestinal: Negative.    Genitourinary: Negative.    Musculoskeletal: Negative.    Skin: Negative.    Neurological: Negative.    Psychiatric/Behavioral: Negative.        Patient Active Problem List    Diagnosis Date Noted     Cataract, unspecified cataract type, unspecified laterality 08/27/2020     Bochdalek hernia 07/09/2020     Other specified glaucoma 07/09/2020     Family history of colon cancer 03/25/2020     Primary osteoarthritis of hands, bilateral 10/04/2018     Atrophic vaginitis 01/23/2018     Osteopenia 03/07/2016     Leiomyoma Of The Uterus      Hyperlipidemia      Mild intermittent asthma      Past Medical History:   Diagnosis Date     Atrophic vaginitis 1/23/2018     Chronic Reflux Esophagitis     Created by Conversion      Fatty Liver     Created by Conversion      Hyperlipidemia     Created by Conversion      Hyperplastic Polyp Of The Large Intestine     Created by Conversion      Leiomyoma Of The Uterus     Created by Conversion       Menopause Has Occurred     Created by Conversion  Replacement Utility updated for latest IMO load     Mild intermittent asthma     Created by [facility] Annotation: Mar 30 2010 11:09AM - Karo Birch: excercise induced      Osteopenia 3/7/2016     Polyarthralgia 10/4/2018     Primary osteoarthritis of hands, bilateral 10/4/2018     Past Surgical History:   Procedure Laterality Date     BUNIONECTOMY      bilateral feet      CARPAL TUNNEL RELEASE      x 2     [place]  DELIVERY ONLY      Description:  Section;  Recorded: 2009;  Comments: X 2     RHINOPLASTY       Current Outpatient Medications   Medication Sig Dispense Refill     albuterol (PROAIR HFA;PROVENTIL HFA;VENTOLIN HFA) 90 mcg/actuation inhaler Inhale 2 puffs every 6 (six) hours as needed for wheezing. 1 each 0     atorvastatin (LIPITOR) 10 MG tablet Take 1 tablet (10 mg total) by mouth daily. 90 tablet 3     calcium carbonate (OS-DIMITRIS) 600 mg calcium (1,500 mg) tablet Take 600 mg by mouth 2 (two) times a day with meals.       cholecalciferol, vitamin D3, 5,000 unit Tab Take 1 tablet by mouth daily.        conjugated estrogens (PREMARIN) vaginal cream Insert 0.5 grams twice weekly into vagina 42.5 g 2     latanoprost (XALATAN) 0.005 % ophthalmic solution INT 1 GTT IN OU QD IN THE BRUCE       melatonin 3 mg Tab tablet Take 3 mg by mouth at bedtime as needed. 3-5 mg       UNABLE TO FIND Med Name: Face treatment for pre cancer cells on face       valACYclovir (VALTREX) 1000 MG tablet TK 2 TS PO Q 12 H FOR 1 DAY AT ONSET OF SYMPTOMS       zinc sulfate (ZINC-15 ORAL) Take 1 tablet by mouth daily.        aspirin 81 MG EC tablet Take 1 tablet (81 mg total) by mouth daily. 150 tablet 2     No current facility-administered medications for this visit.        No Known Allergies    Social History     Tobacco Use     Smoking status: Former Smoker     Smokeless tobacco: Never Used   Substance Use Topics     Alcohol use: Not on file      Family  "History   Problem Relation Age of Onset     Breast cancer Maternal Aunt      Cancer Maternal Uncle      Cancer Maternal Uncle      Colon cancer Sister 69        BRAF mutation     Breast cancer Maternal Aunt      Breast cancer Maternal Aunt      Breast cancer Maternal Aunt      Social History     Substance and Sexual Activity   Drug Use Not on file           Objective   /64 (Patient Site: Left Arm, Patient Position: Sitting, Cuff Size: Adult Regular)   Pulse 76   Temp 97.8  F (36.6  C) (Oral)   Resp 16   Ht 5' 1.5\" (1.562 m)   Wt 118 lb (53.5 kg)   SpO2 98% Comment: room air  Breastfeeding No   BMI 21.93 kg/m    Physical Exam   Constitutional: She is oriented to person, place, and time. No distress.   HENT:   Head: Normocephalic.   Right Ear: External ear normal.   Left Ear: External ear normal.   Mouth/Throat: Oropharynx is clear and moist. No oropharyngeal exudate.   Eyes: Pupils are equal, round, and reactive to light. Conjunctivae and EOM are normal. Left eye exhibits no discharge. No scleral icterus.   Neck: Normal range of motion. No thyromegaly present.   Cardiovascular: Normal rate, regular rhythm and intact distal pulses. Exam reveals no gallop and no friction rub.   No murmur heard.  Pulmonary/Chest: Effort normal and breath sounds normal. No respiratory distress.   Abdominal: Soft. She exhibits no distension and no mass. There is no abdominal tenderness.   Musculoskeletal: Normal range of motion.         General: No edema.   Lymphadenopathy:     She has no cervical adenopathy.   Neurological: She is alert and oriented to person, place, and time. She displays normal reflexes. No cranial nerve deficit.   Skin: Skin is warm and dry.   Psychiatric: Judgment normal.   Nursing note and vitals reviewed.    Recent Labs   Lab Test 07/09/20  0827 03/25/20  0906   HGB 15.6 16.5*    193   NA  --  142   K  --  4.8   CREATININE  --  0.79        PRE-OP Diagnostics:   Labs completed today but not part " of pre-operative assessment.  No EKG required for low risk surgery (cataract, skin procedure, breast biopsy, etc).     Assessment & Plan     The proposed surgical procedure is considered LOW risk.    REVISED CARDIAC RISK INDEX   The patient has the following serious cardiovascular risks for perioperative complications:  No serious cardiac risks = 0 points    INTERPRETATION: 0 points: Class I (very low risk - 0.4% complication rate)    Cataract, unspecified cataract type, unspecified laterality  No contraindication to surgery.  Able to perform >4 metabolic equivalents.     - patient has considered antiemetics but would like to withhold for this procedure.  Symptoms have been mild at most in the past.       The patient has the following additional risks and recommendations for perioperative complications:   - No identified additional risk factors other than previously addressed     MEDICATION INSTRUCTIONS:  Patient is to take all scheduled medications on the day of surgery    RECOMMENDATION:  APPROVAL GIVEN to proceed with proposed procedure, without further diagnostic evaluation.    Return in about 1 year (around 8/27/2021) for Annual physical.    Signed Electronically by: Gume Verduzco MD    Copy of this evaluation report is provided to requesting physician.    Preop [facility] Preop Guidelines    Revised Cardiac Risk Index

## 2021-06-12 NOTE — TELEPHONE ENCOUNTER
Patient coming to [facility] for shingles #2 on 10.8.20. Orders placed, please sign. Thanks.

## 2021-06-15 NOTE — PROGRESS NOTES
Assessment/Plan:      Healthy female exam.   1. Heart palpitations  I recommended checking a Holter monitor as her EKG was normal today.  I also checked some electrolytes and thyroid today.  Her symptoms do not sound overly symptomatic or concerning at this time but I do think they are worth pursuing.  - Electrocardiogram Perform - Clinic  - Holter Monitor; Future  - Magnesium  - Renal Function Profile  - Thyroid [place]    2. Routine Annual Exam  The patient is going to check with her sister's oncologist regarding recommended interval for her colonoscopies and will get back in touch with me.  She states up-to-date with annual mammograms.  She is not due for a Pap smear this year.  We discussed bone health and a referral was placed for an up-to-date DEXA scan and we discussed vitamin D supplementation and a vitamin D level was ordered.  Her immunizations are all up-to-date.  The patient eats a very healthy diet and exercises regularly as well.    3. Hyperlipidemia, unspecified hyperlipidemia type  - Lipid Profile    4. Mild intermittent asthma without complication  Stable    5. Vitamin D deficiency  - Vitamin D, Total (25-Hydroxy)    6. Osteopenia  - DXA Bone Density Scan; Future    7. Atrophic Vaginitis  We discussed options for treating this and a prescription was provided for Estring vaginal ring which the patient is going to try to send in to a [place] pharmacy due to cost issues.      I have had an Advance Directives discussion with the patient.       Subjective:      Lela Beckwith is a 57 y.o. female who presents for an annual exam.  The patient has a few concerns she would like to have addressed at today's visit.  The first is regarding some occasional chest palpitations.  She reports that they last about 15-20 seconds and she gets vague pressure-like sensation in her chest when this happens.  She denies any exertional component to this symptom and really has not noticed any pattern.  She gets this perhaps 4  times per week.  When she is checked her pulse it does seem to be fast when she feels the sensation.  She does exercise regularly and denies any exertional symptoms.  The patient reports that she continues to have vaginal dryness and painful intercourse.  She finds that the estrogen cream is very expensive and that is when she has not been using it.  She recently started looking into the possibility of [facility].  She also wanted to bring to my attention that her sister was recently diagnosed with advanced colon cancer and was told by her oncologist that there is a genetic component to it.  However, he did not discuss specifically what interval would be recommended.  Her last colonoscopy was in 2015 and was normal at that time.  She is otherwise been on a 10 year interval.      Healthy Habits:   Regular Exercise: Yes  Sunscreen Use: Yes  Healthy Diet: Yes  Dental Visits Regularly: Yes  Seat Belt: Yes  Sexually active: Yes  Colonoscopy:   Lipid Profile: Yes  Glucose Screen: N/A  Prevention of Osteoporosis: Yes  Last Dexa: N/A      Immunization History   Administered Date(s) Administered     Hep A, Adult IM (19yr & older) 10/25/2005, 10/28/2005, 2007     Hep A, historic 10/25/2005, 2007     Influenza X2h1-82, 2009     Influenza, Seasonal, Inj PF IIV3 2013     Influenza, inj, historic,unspecified 2015, 2017     Influenza, seasonal,quad inj 6-35 mos 10/01/2009     Td,adult,historic,unspecified 2009     Tdap 2009     ZOSTER 2016     Immunization status: up to date and documented.    Gynecologic History  No LMP recorded. Patient is postmenopausal.  Contraception: post menopausal status  Last Pap: . Results were: normal  Last mammogram: . Results were: normal      OB History    Para Term  AB Living   2 2 2      SAB TAB Ectopic Multiple Live Births             # Outcome Date GA Lbr Matthew/2nd Weight Sex Delivery Anes PTL Lv   2 Term             1 Term                   Current Outpatient Prescriptions   Medication Sig Dispense Refill     ALBUTEROL INHL Inhale.       cholecalciferol, vitamin D3, 5,000 unit Tab Take by mouth.       conjugated estrogens (PREMARIN) vaginal cream Insert 1.5 g into the vagina daily. 42.5 g 0     multivitamin (ONE A DAY) per tablet Take 1 tablet by mouth daily.       estradiol (ESTRING) 2 mg (7.5 mcg /24 hour) vaginal ring Insert 2 mg into the vagina once for 1 dose. follow package directions 1 each 3     No current facility-administered medications for this visit.      No past medical history on file.  Past Surgical History:   Procedure Laterality Date     [place]  DELIVERY ONLY      Description:  Section;  Recorded: 2009;  Comments: X 2     Review of patient's allergies indicates no known allergies.  Family History   Problem Relation Age of Onset     Breast cancer Maternal Aunt      Cancer Maternal Uncle      Cancer Maternal Uncle      No Medical Problems Mother      No Medical Problems Father      Colon cancer Sister 69     BRAF mutation     No Medical Problems Daughter      No Medical Problems Maternal Grandmother      No Medical Problems Maternal Grandfather      No Medical Problems Paternal Grandmother      No Medical Problems Paternal Grandfather      No Medical Problems Paternal Aunt      BRCA 1/2 Neg Hx      Endometrial cancer Neg Hx      Ovarian cancer Neg Hx      Social History     Social History     Marital status:      Spouse name: N/A     Number of children: N/A     Years of education: N/A     Occupational History     Not on file.     Social History Main Topics     Smoking status: Former Smoker     Smokeless tobacco: Never Used     Alcohol use Not on file     Drug use: Not on file     Sexual activity: Not on file     Other Topics Concern     Not on file     Social History Narrative       Review of Systems  General:  Denies problem  Eyes: Denies problem  Ears/Nose/Throat: Denies  "problem  Cardiovascular: Denies problem  Respiratory:  Denies problem  Gastrointestinal:  Denies problem, Genitourinary: Denies problem  Musculoskeletal:  Denies problem  Skin: Denies problem  Neurologic: Denies problem  Psychiatric: Denies problem  Endocrine: Denies problem  Heme/Lymphatic: Denies problem   Allergic/Immunologic: Denies problem        Objective:         Vitals:    01/23/18 0922   BP: 100/68   Pulse: 63   SpO2: 99%   Weight: 128 lb (58.1 kg)   Height: 5' 0.75\" (1.543 m)     Body mass index is 24.38 kg/(m^2).    Physical Exam:  General Appearance: Alert, cooperative, no distress, appears stated age  Head: Normocephalic, without obvious abnormality, atraumatic  Eyes: PERRL, conjunctiva/corneas clear, EOM's intact  Ears: Normal TM's and external ear canals, both ears  Nose: Nares normal, septum midline,mucosa normal, no drainage  Throat: Lips, mucosa, and tongue normal; teeth and gums normal  Neck: Supple, symmetrical, trachea midline, no adenopathy;  thyroid: not enlarged, symmetric, no tenderness/mass/nodules; no carotid bruit or JVD  Back: Symmetric, no curvature, ROM normal, no CVA tenderness  Lungs: Clear to auscultation bilaterally, respirations unlabored  Breasts: No breast masses, tenderness, asymmetry, or nipple discharge.  Heart: Regular rate and rhythm, S1 and S2 normal, no murmur, rub, or gallop, Abdomen: Soft, non-tender, bowel sounds active all four quadrants,  no masses, no organomegaly  Pelvic:Not examined  Extremities: Extremities normal, atraumatic, no cyanosis or edema  Skin: Skin color, texture, turgor normal, no rashes or lesions  Lymph nodes: Cervical, supraclavicular, and axillary nodes normal  Neurologic: Normal        "

## 2021-06-16 PROBLEM — H26.9 CATARACT, UNSPECIFIED CATARACT TYPE, UNSPECIFIED LATERALITY: Status: ACTIVE | Noted: 2020-08-27

## 2021-06-16 PROBLEM — Z80.0 FAMILY HISTORY OF COLON CANCER: Status: ACTIVE | Noted: 2020-03-25

## 2021-06-16 PROBLEM — Q79.0 BOCHDALEK HERNIA: Status: ACTIVE | Noted: 2020-07-09

## 2021-06-16 PROBLEM — N95.2 ATROPHIC VAGINITIS: Status: ACTIVE | Noted: 2018-01-23

## 2021-06-16 PROBLEM — H40.89 OTHER SPECIFIED GLAUCOMA: Status: ACTIVE | Noted: 2020-07-09

## 2021-06-16 PROBLEM — M19.042 PRIMARY OSTEOARTHRITIS OF HANDS, BILATERAL: Status: ACTIVE | Noted: 2018-10-04

## 2021-06-16 PROBLEM — M19.041 PRIMARY OSTEOARTHRITIS OF HANDS, BILATERAL: Status: ACTIVE | Noted: 2018-10-04

## 2021-06-17 NOTE — PATIENT INSTRUCTIONS - HE
Patient Instructions by Hemant Mike MD at 4/18/2019  5:50 PM     Author: Hemant Mike MD Service: -- Author Type: Physician    Filed: 4/18/2019  6:57 PM Encounter Date: 4/18/2019 Status: Addendum    : Hemant Mike MD (Physician)    Related Notes: Original Note by Hemant Mike MD (Physician) filed at 4/18/2019  6:56 PM       - Your knee examination is consistent with a contusion and medial collateral ligament sprain.   - Ice your knee for 20 minutes at least four times a day.   - Rest your knee as much as possible.   - Take ibuprofen 600 mg every 6 hours as needed for pain.   - May also take acetaminophen 1000 mg every 6 hours if needed for additional pain control.   - Be seen for follow up in 1 week. Return sooner if symptoms are worsening in the meantime.       Patient Education     Understanding Bone Bruise (Bone Contusion)  A bone bruise is an injury to a bone that is less severe than a bone fracture. Bone bruises are fairly common. They can happen to people of all ages. Any type of bone in your body can be bruised. Other injuries often happen along with a bone bruise, such as damage to nearby ligaments.  What happens when a bone is bruised?  Bone is made of different kinds of tissue. The periosteum is a thin layer of tissue that covers most of a bone. Where bones come together, there is usually a layer of cartilage at the edges. The bone here is called subchondral bone. Deep inside the bone is an area called the medulla. It contains the bone marrow and fibrous tissue called trabeculae.  With a bone fracture, all of the trabeculae in a region of bone have broken. But with a bone bruise, an injury only damages some of these trabeculae. An injury might cause blood to build up in the area beneath the periosteum. This causes a subperiosteal hematoma, a type of bone bruise. An injury might also cause bleeding and swelling in the area between your cartilage and the bone beneath it.  This causes a subchondral bone bruise. Or bleeding and swelling can occur in the medulla of your bone. This is called an intraosseous bone bruise.  What causes a bone bruise?  Injury of any kind can cause a bone bruise. Sports injuries, motor vehicle accidents, or falls from a height can cause them. Twisting injuries that cause joint sprains can also cause a bone bruise. Health conditions like arthritis may also lead to a bone bruise. This is because arthritis causes bone surfaces to grind against each other. Child abuse is another cause of bone bruises.  Symptoms of a bone bruise  Symptoms of a bone bruise can include:    Pain and soreness in the injured area    Swelling in the area and soft tissues around it    Change in color of the injured area    Swelling or stiffness of an injured joint  This pain is often more severe and lasts longer than a soft tissue injury. How severe your symptoms are and how long they last depends on how severe the bone bruise is.  Diagnosing a bone bruise  Your healthcare provider will ask you about your medical history and symptoms. He or she will ask how you got your injury. Your provider will examine the injured area to check for pain, bruising, and swelling. After the exam, your health care provider may be able to tell if you have a bone bruise.  A bone bruise doesnt show up on an X-ray. But you may be given an X-ray to rule out a bone fracture. A fracture may need a different kind of treatment. An MRI can confirm a bone bruise. But your healthcare provider will likely only give you an MRI if your symptoms dont get better.  Date Last Reviewed: 4/1/2017 2000-2017 [employer]. [address] [place] [address], [place] [address]. All rights reserved. This information is not intended as a substitute for professional medical care. Always follow your healthcare professional's instructions.           Patient Education     Understanding Medial Collateral Ligament Sprain    The knee  is a complex joint where the thighbone (femur) meets the shinbone (tibia). Strong tissues called ligaments connect these bones together. Ligaments also keep the bones aligned, so the knee only bends how it is supposed to. The medial collateral ligament (MCL) runs across the knee joint on the medial side of the leg. Injury to this ligament may be very painful. The knee may also not work the way it should.  Causes of an MCL sprain  An MCL sprain often happens when the knee joint is pushed beyond its normal range of motion. It is most common during a blow to the knee from the outside, pushing the knee inward. It may also happen if the knee is forced into a twist. These movements stretch and tear the MCL. Other parts of the knee may be damaged along with the MCL.  Symptoms of an MCL sprain  These include:    Knee pain    Knee swelling    Locking of the joint    Wobbly or unstable feeling in the joint  Treatment for an MCL sprain  Treatment will depend on the severity of the sprain and whether there is damage to other parts of the knee. Options often include:    Rest. This allows the knee to heal. Activities that stress the knee should be avoided. Crutches, a knee brace, or both may also be recommended for a short time.    Cold packs and elevation of the knee. These help reduce swelling and relieve pain.    Compression. The knee may be wrapped with a bandage to help reduce swelling.    Medicines. These help relieve pain and swelling.    Exercises. These help improve the knees stability, strength, and range of motion.  If the injury is severe or several parts of the joint are involved, surgery may be an option. Surgery repairs the MCL and any other damaged structures.     When to call your healthcare provider  Call your healthcare provider right away if you have any of these:    Pain, swelling, or instability that doesnt get better with treatment or gets worse    New symptoms   Date Last Reviewed: 3/10/2016    [id number]  [employer]. [address] [place] [address], [place] [address]. All rights reserved. This information is not intended as a substitute for professional medical care. Always follow your healthcare professional's instructions.

## 2021-06-17 NOTE — PATIENT INSTRUCTIONS - HE
Patient Instructions by Yung Rose PA-C at 8/25/2019  3:50 PM     Author: Yung Rose PA-C Service: -- Author Type: Physician Assistant    Filed: 8/25/2019  4:15 PM Encounter Date: 8/25/2019 Status: Addendum    : Yung Rose PA-C (Physician Assistant)    Related Notes: Original Note by Yung Rose PA-C (Physician Assistant) filed at 8/25/2019  4:14 PM       You have pain over your scaphoid bone.  You need to have a advanced imaging test called a CT scan to further evaluate the injury.  Follow-up with [facility] tomorrow for evaluation and treatment.  Use brace and elevate the hand above level of the heart continuously.  Protect the wrist from further trauma.      Patient Education     Possible Wrist Fracture  Follow up with your healthcare provider in one week, or as advised. This is to be sure the bone is healing properly.  If X-rays were taken, you will be told of any new findings that may affect your care.  You are very sore over a bone in your wrist called the navicular, or scaphoid, bone. This could be a sign of a hairline fracture, or break, even though no fracture was seen on the X-ray. Therefore, a splint or cast will be applied until repeat X-rays are taken in about 1 to 2 weeks. If you have a hairline fracture, it will show up on the second X-ray and you will have to keep wearing a cast for about 6 to 20 weeks, depending on the location of the fracture. If no fracture is seen on the second X-ray, this means you only have a wrist sprain. The splint or cast can be removed.     Home care    Keep your arm raised to reduce pain and swelling. When sitting or lying down, raise your arm above the level of your heart. You can do this by placing your arm on a pillow that rests on your chest or on a pillow at your side. This is most important during the first 48 hours after injury.    Apply an ice pack over the injured area for no more than 15 to 20 minutes. Do this every 1 to 2 hours for the  first 24 to 48 hours. To make an ice pack, put ice cubes in a plastic bag that seals at the top. Wrap the bag in a clean, thin towel or cloth. Never put ice or an ice pack directly on the skin. As the ice melts, be careful that the cast or splint doesnt get wet. You can place the ice pack inside the sling and directly over the splint or cast. Keep using ice packs as needed to ease pain and swelling.    Keep the cast or splint completely dry at all times. Bathe with your cast or splint out of the water. Protect it with 2 large plastic bags. Place 1 bag around the other. Tape each bag with duct tape at the top end. If a fiberglass cast or splint gets wet, you can dry it with a hair dryer on a cool setting.    You may use over-the-counter pain medicine to control pain, unless another pain medicine was prescribed. If you have chronic liver or kidney disease or ever had a stomach ulcer or GI (gastrointestinal) bleeding, talk with your provider before using these medicines.    If you smoke, try to quit. Tobacco use can interfere with the healing of this fracture. It can also increase the risk of a complication needing surgery.  Follow-up care  Follow up with your healthcare provider in 1 week, or as advised. This is to be sure the bone is healing properly.  If X-rays were taken, you will be told of any new findings that may affect your care.  When to seek medical advice  Call your healthcare provider right away if any of the following occur:    The plaster cast or splint becomes wet or soft    The plaster cast or splint becomes loose    The fiberglass cast or splint remains wet for more than 24 hours    Increased tightness or pain occurs under the cast or splint    Fingers become swollen, cold, blue, numb, or tingly  Date Last Reviewed: 12/3/2015    [id number] [employer]. [address] [place] [address], [place] [address]. All rights reserved. This information is not intended as a substitute for professional medical  care. Always follow your healthcare professional's instructions.

## 2021-06-19 NOTE — PROGRESS NOTES
Assessment:     1. Localized superficial swelling, mass, or lump  US Arm Non Vascular Left   2. Skin lesion  Ambulatory referral to Dermatology   3. Trigger finger, acquired     4. Atrophic vaginitis         Plan:     Lela is a 58-year-old female presenting today with 4 separate concerns.  I referred the patient for an ultrasound to better define and evaluate the lump on her left arm although I suspect that this is a benign lipoma.  The lesion under her right eye does appear concerning for a possible skin cancer and a referral was placed to dermatology for consultation regarding this.  I discussed treatment approach to trigger finger conservatively starting with splinting and modification of activity.  I provided the patient with a finger splint today and asked her to call back if in 6-10 weeks her symptoms are not improving and a referral would be placed to an orthopedic hand specialist for consideration of a cortisone injection.     Subjective:       58 y.o. female presents regarding a few concerns.  The first is regarding a new lump she found on her left proximal arm.  She noticed this about 3 weeks ago and really has not noticed it changing nor is it symptomatic.  However, her sister passed away last year from colon cancer which presented with a lump in the same area so she was concerned.  The patient also would like a refill on her Estring and send that prescription to a [place] mail-in pharmacy.  The patient has noticed a spot under her right eye that has not been healing for the past 2-3 months.  Finally, the patient has a trigger finger involving her left hand.  The patient has had trigger fingers previously and had to have those surgically corrected and is hoping to avoid surgery at this time if possible.      Reviewed: The following portions of the patient's history were reviewed and updated as appropriate: allergies, current medications, past family history, past medical history, past social history,  "past surgical history and problem list.    Review of Systems  Pertinent items are noted in HPI.       Objective:     BP 98/58 (Patient Site: Left Arm, Patient Position: Sitting, Cuff Size: Adult Regular)  Pulse 72  Ht 5' 0.75\" (1.543 m)  Wt 128 lb (58.1 kg)  BMI 24.38 kg/m2  General appearance: alert, appears stated age and cooperative  Extremities: tenderness palmar aspect of base of 4th digit with triggering noted  Skin: tiny lesion under left eye with small inner bleeding scab  Left arm: 2cm subcutaneous lump       This note has been dictated using voice recognition software. Any grammatical or context distortions are unintentional and inherent to the software  "

## 2021-06-20 ENCOUNTER — HEALTH MAINTENANCE LETTER (OUTPATIENT)
Age: 61
End: 2021-06-20

## 2021-06-20 NOTE — PROGRESS NOTES
ASSESSMENT AND PLAN:  Lela Beckwith 58 y.o. female is seen here on 10/04/18 for evaluation of joint pains.  She has ample evidence of osteoarthritis.  Likelihood of inflammatory arthropathy connective tissue disease appears to be remote.  As long as she or any family member do not have psoriasis, osteoarthritis remains the prime diagnosis here.  This is discussed with her at length.  The various characteristics of OA were outlined including the sinuous profile of the pain independent of at times, off severity such as radiologically.  The essentially symptomatic nature of treatment options.  She will decide based on these principles.  She will like to return for follow-up on as-needed basis.  Diagnoses and all orders for this visit:    Polyarthralgia    Primary osteoarthritis of hands, bilateral      HISTORY OF PRESENTING ILLNESS:  Lela Beckwith, 58 y.o., female is here for evaluation of pain in her joints.  This is predominantly in her hands.  She points to the DIPs PIPs.  This is gone over the past 6 months.  This is worse with activity.  At times it can be stiff for up to an hour in the morning.  She had triggering of the left ring finger for which she was seen in hand surgery corticosteroid injection was helpful.  She has modified her diet.  She has cut back on red meat.  She has modified her activities.  Her pain has improved significantly she still gets some stiffness.  She has noted discomfort in the first MTPs.  She may end up having surgery there.  She rated pain between mild to moderate.  This is more so on the left index middle finger.  She is able do all her day-to-day activities.  She has no history of psoriasis ulcerative colitis Crohn's disease.  As per as she is aware her parents did not have similar changes in her in their hands.  She has had carpal tunnel surgery done on both sides several years ago.  She is not a smoker alcohol up to 3 drinks per week.  She has describes herself otherwise in good  "health.  She is a school nurse.  Further historical information and ADL limitations as noted in the multidimensional health assessment questionnaire attached in the EMR. Rest of the 13 system ROS is negative.     ALLERGIES:Review of patient's allergies indicates no known allergies.    PAST MEDICAL/ACTIVE PROBLEMS/MEDICATION/ FAMILY HISTORY/SOCIAL DATA:  The patient has a family history of  No past medical history on file.  History   Smoking Status     Former Smoker   Smokeless Tobacco     Never Used     Patient Active Problem List   Diagnosis     Hyperplastic Polyp Of The Large Intestine     Leiomyoma Of The Uterus     Chronic Reflux Esophagitis     Hyperlipidemia     Fatty Liver     Menopause Has Occurred     Mild intermittent asthma     Osteopenia     Atrophic vaginitis     Current Outpatient Prescriptions   Medication Sig Dispense Refill     calcium carbonate (OS-DIMITRIS) 600 mg calcium (1,500 mg) tablet Take 600 mg by mouth 2 (two) times a day with meals.       cholecalciferol, vitamin D3, 5,000 unit Tab Take by mouth.       conjugated estrogens (PREMARIN) vaginal cream Insert into the vagina daily. Pt unaware of dosage       glucosam/chond-msm1/C/salvador/bor (GLUCOSAM-CHOND-MSM,WITH BORON, ORAL) Take by mouth.       multivitamin (ONE A DAY) per tablet Take 1 tablet by mouth daily.       UNABLE TO FIND Med Name:Tumeric       ALBUTEROL INHL Inhale.       No current facility-administered medications for this visit.        COMPREHENSIVE EXAMINATION:  Vitals:    10/04/18 0757   BP: 110/62   Weight: 126 lb 14.4 oz (57.6 kg)   Height: 5' 0.75\" (1.543 m)     A well appearing alert oriented female. Vital data as noted above. Her eyes without inflammation/scleromalacia. ENTwithout oral mucositis, thrush, nasal deformity, external ear redness, deformity. Her neck is without lymphadenopathy and supple. Lungs normal sounds, no pleural rub. Heart auscultation normal rate, rhythm; no pericardial rub and murmurs. Abdomen soft, non " tender, no organomegaly. Skin examined for heliotrope, malar area eruption, lupus pernio, periungual erythema, sclerodactyly, papules, erythema nodosum, purpura, nail pitting, onycholysis, and obvious psoriasis lesion. Neurological examination shows normal alertness, speech, facial symmetry, tone and power in upper and lower extremities, Tinel's and Phalen's at wrist and gait. The joint examination is performed for swelling, tenderness, warmth, erythema, and range of motion in the following joints: DIPs, PIPs, MCPs, wrists, first CMC's, elbows, shoulders, hips, knees, ankles, feet; spine for range of motion and paraspinal muscles for tenderness. The salient normal / abnormal findings are appended.  She has early Heberden nodes.  She is tender in various PIPs, DIPs.  There is no tenderness or swelling of the MCPs.  She does not have synovitis in any of the palpable appendical joints.  She does not have dactylitis.  There is no enthesitis.  She has mild tenderness of the first MTPs.    LAB / IMAGING DATA:  ALT   Date Value Ref Range Status   03/07/2016 36 0 - 45 U/L Final   02/24/2012 20 <46 IU/L Final   10/01/2009 38 30 - 65 U/L Final     Albumin   Date Value Ref Range Status   01/23/2018 3.8 3.5 - 5.0 g/dL Final   03/07/2016 4.0 3.5 - 5.0 g/dL Final   02/24/2012 4.3 3.5 - 5.0 g/dL Final     Creatinine   Date Value Ref Range Status   01/23/2018 0.77 0.60 - 1.10 mg/dL Final   06/28/2009 0.8 0.5 - 1.3 mg/dL Final       WBC   Date Value Ref Range Status   10/01/2009 5.2 4.0 - 11.0 thou/uL Final   06/28/2009 7.2 4.0 - 11.0 thou/uL Final     Hemoglobin   Date Value Ref Range Status   10/27/2014 13.5 12.0 - 16.0 g/dL Final   05/02/2011 14.2 12.0 - 16.0 g/dL Final   10/01/2009 14.1 12.0 - 16.0 g/dL Final     Platelets   Date Value Ref Range Status   10/01/2009 187 140 - 440 thou/uL Final   06/28/2009 188 140 - 440 thou/uL Final       Lab Results   Component Value Date    KARI 0.15 12/23/2009    RF <10 12/23/2009    SEDRATE 7  12/23/2009

## 2021-06-20 NOTE — PROGRESS NOTES
Preoperative Exam    Scheduled Procedure: Bunionectomy  Surgery Date:  10/09/18  Surgery Location: [facility]  Surgeon:  Dr Byrnes    Assessment/Plan:     1. Preoperative examination  Patient approved for surgery    2. Foot pain, left  Plan for bunionectomy left foot.         Surgical Procedure Risk: Low (reported cardiac risk generally < 1%)  Have you had prior anesthesia?: Yes  Have you or any family members had a previous anesthesia reaction:  No  Do you or any family members have a history of a clotting or bleeding disorder?: No  Cardiac Risk Assessment: no increased risk for major cardiac complications    Patient approved for surgery with general or local anesthesia.    Please Note:    Functional Status: Independent  Patient plans to recover at home with family.     Subjective:      Lela Beckwith is a 58 y.o. female who presents for a preoperative consultation.  The patient has been having progressive pain in her left foot at the base of the 1st toe due to bunion deformity. She is having pain with activities including simply walking barefoot at home.     All other systems reviewed and are negative, other than those listed in the HPI.    Pertinent History  Do you have difficulty breathing or chest pain after walking up a flight of stairs: No  History of obstructive sleep apnea: No  Steroid use in the last 6 months: Yes: cortisone shot in hand  Frequent Aspirin/NSAID use: No  Prior Blood Transfusion: No  Prior Blood Transfusion Reaction: No  If for some reason prior to, during or after the procedure, if it is medically indicated, would you be willing to have a blood transfusion?:  There is no transfusion refusal.    Current Outpatient Prescriptions   Medication Sig Dispense Refill     ALBUTEROL INHL Inhale.       calcium carbonate (OS-DIMITRIS) 600 mg calcium (1,500 mg) tablet Take 600 mg by mouth 2 (two) times a day with meals.       cholecalciferol, vitamin D3, 5,000 unit Tab Take by mouth.       conjugated estrogens  "(PREMARIN) vaginal cream Insert into the vagina daily. Pt unaware of dosage       glucosam/chond-msm1/C/salvador/bor (GLUCOSAM-CHOND-MSM,WITH BORON, ORAL) Take by mouth.       multivitamin (ONE A DAY) per tablet Take 1 tablet by mouth daily.       UNABLE TO FIND Med Name:Gualberto       UNABLE TO FIND Med Name: Face treatment for pre cancer cells on face       No current facility-administered medications for this visit.         No Known Allergies    Patient Active Problem List   Diagnosis     Hyperplastic Polyp Of The Large Intestine     Leiomyoma Of The Uterus     Chronic Reflux Esophagitis     Hyperlipidemia     Fatty Liver     Menopause Has Occurred     Mild intermittent asthma     Osteopenia     Atrophic vaginitis     Polyarthralgia     Primary osteoarthritis of hands, bilateral       No past medical history on file.    Past Surgical History:   Procedure Laterality Date     [place]  DELIVERY ONLY      Description:  Section;  Recorded: 2009;  Comments: X 2     RHINOPLASTY         Social History     Social History     Marital status:      Spouse name: N/A     Number of children: N/A     Years of education: N/A     Occupational History     Not on file.     Social History Main Topics     Smoking status: Former Smoker     Smokeless tobacco: Never Used     Alcohol use Not on file     Drug use: Not on file     Sexual activity: Not on file     Other Topics Concern     Not on file     Social History Narrative       Patient Care Team:  Jeny Braon MD as PCP - General          Objective:     Vitals:    10/08/18 1104   BP: 112/68   Pulse: 87   SpO2: 98%   Weight: 126 lb (57.2 kg)   Height: 5' 0.5\" (1.537 m)         Physical Exam:  Physical Exam   Constitutional: She is oriented to person, place, and time.   HENT:   Head: Normocephalic and atraumatic.   Mouth/Throat: Oropharynx is clear and moist.   Eyes: Pupils are equal, round, and reactive to light.   Cardiovascular: Normal rate, regular rhythm " and normal heart sounds.    Pulmonary/Chest: Effort normal and breath sounds normal.   Abdominal: Soft. She exhibits no mass.   Lymphadenopathy:     She has no cervical adenopathy.     She has no axillary adenopathy.   Neurological: She is alert and oriented to person, place, and time.   Psychiatric: She has a normal mood and affect.   Nursing note and vitals reviewed.        EKG:  NSR, poor R wave progression ? Q wave V2. Same when compared to 1/2018    Labs:  Labs pending at this time.  Results will be reviewed when available.    Immunization History   Administered Date(s) Administered     Hep A, Adult IM (19yr & older) 10/25/2005, 10/28/2005, 04/11/2007     Hep A, historic 10/25/2005, 04/11/2007     Influenza Q8t4-22, 12/23/2009     Influenza, Seasonal, Inj PF IIV3 01/08/2013     Influenza, inj, historic,unspecified 09/29/2015, 09/12/2017     Influenza, seasonal,quad inj 6-35 mos 10/01/2009     Td,adult,historic,unspecified 03/02/2009     Tdap 03/02/2009     ZOSTER, LIVE 03/07/2016           Electronically signed by Jeny Baron MD 10/08/18 11:07 AM

## 2021-06-22 NOTE — PROGRESS NOTES
Preoperative Exam    Scheduled Procedure: Right Osteotomy, Possible right Juan M's deformity repair, Left 4th trigger finger release  Surgery Date:  12/11/18  Surgery Location: [facility]  [phone]  Surgeon:  Dr. Samantha Howell    Assessment/Plan:     1. Encounter for preoperative examination for general surgical procedure  Cleared for surgery.    2. Left foot pain    3. Trigger ring finger of left hand    Surgical Procedure Risk: Low (reported cardiac risk generally < 1%)  Have you had prior anesthesia?: Yes  Have you or any family members had a previous anesthesia reaction:  No  Do you or any family members have a history of a clotting or bleeding disorder?: No  Cardiac Risk Assessment: no increased risk for major cardiac complications    Patient approved for surgery with general or local anesthesia.    Please Note:  None    Functional Status: Independent  Patient plans to recover at home with family.     Subjective:      Lela Beckwith is a 58 y.o. female who presents for a preoperative consultation.  Patient is scheduled for a right bunionectomy/osteotomy.  She had the same surgery completed on the left foot in October - surgery went well.  She may also have a Juan M's deformity repair - she is still deciding on if she would like to do this.  She is also having a left 4th finger sling release for a trigger finger.  Patient in the right foot is worse with long amount of walking.    Patient is postmenopausal.    All other systems reviewed and are negative, other than those listed in the HPI.    Pertinent History  Do you have difficulty breathing or chest pain after walking up a flight of stairs: No  History of obstructive sleep apnea: No  Steroid use in the last 6 months: No  Frequent Aspirin/NSAID use: No  Prior Blood Transfusion: No  Prior Blood Transfusion Reaction: N/A  If for some reason prior to, during or after the procedure, if it is medically indicated, would you be willing to  have a blood transfusion?:  There is no transfusion refusal.    Current Outpatient Medications   Medication Sig Dispense Refill     ALBUTEROL INHL Inhale.       calcium carbonate (OS-DIMITRIS) 600 mg calcium (1,500 mg) tablet Take 600 mg by mouth 2 (two) times a day with meals.       cholecalciferol, vitamin D3, 5,000 unit Tab Take by mouth.       conjugated estrogens (PREMARIN) vaginal cream Insert into the vagina daily. Pt unaware of dosage       glucosam/chond-msm1/C/salvador/bor (GLUCOSAM-CHOND-MSM,WITH BORON, ORAL) Take by mouth.       multivitamin (ONE A DAY) per tablet Take 1 tablet by mouth daily.       UNABLE TO FIND Med Name:Gualberto       UNABLE TO FIND Med Name: Face treatment for pre cancer cells on face       No current facility-administered medications for this visit.         No Known Allergies    Patient Active Problem List   Diagnosis     Hyperplastic Polyp Of The Large Intestine     Leiomyoma Of The Uterus     Chronic Reflux Esophagitis     Hyperlipidemia     Fatty Liver     Menopause Has Occurred     Mild intermittent asthma     Osteopenia     Atrophic vaginitis     Polyarthralgia     Primary osteoarthritis of hands, bilateral       No past medical history on file.    Past Surgical History:   Procedure Laterality Date     [place]  DELIVERY ONLY      Description:  Section;  Recorded: 2009;  Comments: X 2     RHINOPLASTY         Social History     Socioeconomic History     Marital status:      Spouse name: Not on file     Number of children: Not on file     Years of education: Not on file     Highest education level: Not on file   Social Needs     Financial resource strain: Not on file     Food insecurity - worry: Not on file     Food insecurity - inability: Not on file     Transportation needs - medical: Not on file     Transportation needs - non-medical: Not on file   Occupational History     Not on file   Tobacco Use     Smoking status: Former Smoker     Smokeless tobacco: Never  Used   Substance and Sexual Activity     Alcohol use: Not on file     Drug use: Not on file     Sexual activity: Not on file   Other Topics Concern     Not on file   Social History Narrative     Not on file       Patient Care Team:  Jeny Baron MD as PCP - General          Objective:     Vitals:    12/10/18 1019   BP: 118/60   Pulse: 80   Weight: 127 lb 6.4 oz (57.8 kg)         Physical Exam:  General Appearance: Alert, cooperative, no distress, appears stated age  Eyes: PERRL, conjunctiva/corneas clear, EOM's intact. Wearing corrective glasses.  Ears: Normal TM's and external ear canals, both ears  Nose: Nares normal, septum midline  Throat: Lips, mucosa, and tongue normal; teeth and gums normal  Neck: Supple, symmetrical, trachea midline, no adenopathy;  thyroid: not enlarged, symmetric, no tenderness/mass/nodules; no carotid bruit or JVD  Lungs: Clear to auscultation bilaterally, respirations unlabored  Heart: Regular rate and rhythm, S1 and S2 normal, no murmur, rub, or gallop  Abdomen: Soft, non-tender, bowel sounds active all four quadrants,  no masses, no organomegaly  Extremities: Extremities normal, atraumatic, no cyanosis or edema  Skin: Skin color, texture, turgor normal, no rashes or lesions  Lymph nodes: Cervical, supraclavicular nodes normal  Neurologic: Normal   Psychologic: appropriate affective, answers all of my questions appropriately. No hallucinations, delusion, or suicidal ideations.      Patient Instructions     Hold all supplements, aspirin and NSAIDs for 7 days prior to surgery.  Follow your surgeon's direction on when to stop eating and drinking prior to surgery.  Your surgeon will be managing your pain after your surgery.    Remove all jewelry and metal piercings before your surgery.   Remove nail polish from fingers before surgery.      EKG:  Completed 10/8/2018 - NSR    Labs:  No labs were ordered during this visit  Lab Results   Component Value Date    HGB 14.2 10/08/2018          Immunization History   Administered Date(s) Administered     Hep A, Adult IM (19yr & older) 10/25/2005, 10/28/2005, 04/11/2007     Hep A, historic 10/25/2005, 04/11/2007     Influenza V7d3-72, 12/23/2009     Influenza, Seasonal, Inj PF IIV3 01/08/2013     Influenza, inj, historic,unspecified 09/29/2015, 09/12/2017     Influenza, seasonal,quad inj 6-35 mos 10/01/2009     Td,adult,historic,unspecified 03/02/2009     Tdap 03/02/2009     ZOSTER, LIVE 03/07/2016           Electronically signed by Dian Griggs NP 12/10/18 10:17 AM

## 2021-06-23 NOTE — PROGRESS NOTES
Preoperative Exam    Scheduled Procedure: Mohs procedure/closure  Surgery Date:  02/07/19  Surgery Location: [facility]  Surgeon:  Dr Medina    Assessment/Plan:     1. Preop general physical exam  Approved for surgery    2. Skin cancer, basal cell      3. Mild intermittent asthma without complication  Stable        Surgical Procedure Risk: Low (reported cardiac risk generally < 1%)  Have you had prior anesthesia?: Yes  Have you or any family members had a previous anesthesia reaction:  No  Do you or any family members have a history of a clotting or bleeding disorder?: No  Cardiac Risk Assessment: no increased risk for major cardiac complications    Patient approved for surgery with general or local anesthesia.      Functional Status: Independent  Patient plans to recover at home with family.     Subjective:      Lela Beckwith is a 58 y.o. female who presents for a preoperative consultation.  The patient was diagnosed with basal cell skin cancer near the inner corner of her right eye and is scheduled for definitive excision.     All other systems reviewed and are negative, other than those listed in the HPI.    Pertinent History  Do you have difficulty breathing or chest pain after walking up a flight of stairs: No  History of obstructive sleep apnea: No  Steroid use in the last 6 months: No  Frequent Aspirin/NSAID use: No  Prior Blood Transfusion: No  Prior Blood Transfusion Reaction: No  If for some reason prior to, during or after the procedure, if it is medically indicated, would you be willing to have a blood transfusion?:  There is no transfusion refusal.    Current Outpatient Medications   Medication Sig Dispense Refill     ALBUTEROL INHL Inhale.       calcium carbonate (OS-DIMITRIS) 600 mg calcium (1,500 mg) tablet Take 600 mg by mouth 2 (two) times a day with meals.       cholecalciferol, vitamin D3, 5,000 unit Tab Take by mouth.       conjugated estrogens (PREMARIN) vaginal cream Insert into the  vagina daily. Pt unaware of dosage       multivitamin (ONE A DAY) per tablet Take 1 tablet by mouth daily.       UNABLE TO FIND Med Name: Face treatment for pre cancer cells on face       vit B comp no.3-folic-C-biotin (NEPHRO-YNES) 1- mg-mg-mcg Tab tablet Take 1 tablet by mouth daily.       vitamin A-vitamin C-vit E-min (OCUVITE) Tab tablet Take by mouth daily.       No current facility-administered medications for this visit.         No Known Allergies    Patient Active Problem List   Diagnosis     Hyperplastic Polyp Of The Large Intestine     Leiomyoma Of The Uterus     Chronic Reflux Esophagitis     Hyperlipidemia     Fatty Liver     Menopause Has Occurred     Mild intermittent asthma     Osteopenia     Atrophic vaginitis     Polyarthralgia     Primary osteoarthritis of hands, bilateral       No past medical history on file.    Past Surgical History:   Procedure Laterality Date     [place]  DELIVERY ONLY      Description:  Section;  Recorded: 2009;  Comments: X 2     RHINOPLASTY         Social History     Socioeconomic History     Marital status:      Spouse name: Not on file     Number of children: Not on file     Years of education: Not on file     Highest education level: Not on file   Social Needs     Financial resource strain: Not on file     Food insecurity - worry: Not on file     Food insecurity - inability: Not on file     Transportation needs - medical: Not on file     Transportation needs - non-medical: Not on file   Occupational History     Not on file   Tobacco Use     Smoking status: Former Smoker     Smokeless tobacco: Never Used   Substance and Sexual Activity     Alcohol use: Not on file     Drug use: Not on file     Sexual activity: Not on file   Other Topics Concern     Not on file   Social History Narrative     Not on file       Patient Care Team:  Jeny Baron MD as PCP - General          Objective:     Vitals:    19 1450   BP: 120/72   Pulse: 86  "  Weight: 129 lb (58.5 kg)   Height: 5' 0.5\" (1.537 m)         Physical Exam:  Physical Exam   Constitutional: She is oriented to person, place, and time.   HENT:   Head: Normocephalic and atraumatic.   Mouth/Throat: Oropharynx is clear and moist.   Eyes: Pupils are equal, round, and reactive to light.   Cardiovascular: Normal rate, regular rhythm and normal heart sounds.   Pulmonary/Chest: Effort normal and breath sounds normal.   Abdominal: Soft. She exhibits no mass.   Lymphadenopathy:     She has no cervical adenopathy.     She has no axillary adenopathy.   Neurological: She is alert and oriented to person, place, and time.   Psychiatric: She has a normal mood and affect.   Nursing note and vitals reviewed.          Immunization History   Administered Date(s) Administered     Hep A, Adult IM (19yr & older) 10/25/2005, 10/28/2005, 04/11/2007     Hep A, historic 10/25/2005, 04/11/2007     Influenza E3p9-41, 12/23/2009     Influenza, Seasonal, Inj PF IIV3 01/08/2013     Influenza, inj, historic,unspecified 09/29/2015, 09/12/2017     Influenza, seasonal,quad inj 6-35 mos 10/01/2009     Td,adult,historic,unspecified 03/02/2009     Tdap 03/02/2009     ZOSTER, LIVE 03/07/2016           Electronically signed by Jeny Baron MD 01/29/19 2:55 PM  "

## 2021-06-23 NOTE — TELEPHONE ENCOUNTER
Question following Office Visit  When did you see your provider: today  What is your question: I am calling with the correct fax number [phone] to the surgery center I gave the wrong one in clinic.     Okay to leave a detailed message: Yes

## 2021-06-24 NOTE — PATIENT INSTRUCTIONS - HE
"Mammogram due 2/2020    Colonoscopy due 1/2020    \"New\" Shingles vaccine due now (check with insurance)      "

## 2021-06-24 NOTE — PROGRESS NOTES
Assessment/Plan:      Healthy female exam.   1. Routine general medical examination at a health care facility  The patient is generally doing quite well and takes good care of herself.  She has been reducing the amount of red meat and is looking forward to seeing the effect that has on her cholesterol.  She is due for tetanus and that was updated today we also discussed recommendations to get the new shingles vaccine.  She is up-to-date on her bone density test and does supplement vitamin D.  Pap smear was due and was updated at today's visit and I communicated to the patient when she is next due for her colonoscopy and mammogram.    2. Trochanteric bursitis of left hip  Discussed the diagnosis and provided her with some written information regarding this.    3. Hyperlipidemia, unspecified hyperlipidemia type  - Lipid Profile    4. Mild intermittent asthma without complication  Stable and doing well.    5. Osteopenia, unspecified location  Recheck DEXA scan in 1-2 years.    6. Atrophic vaginitis  Continue Estring.         Subjective:      Lela Beckwith is a 58 y.o. female who presents for an annual exam.  The patient has a couple of questions or concerns today.  The first is regarding left lateral hip discomfort.  She describes it is fairly mild but would like to know what it is and what she can do about it.  She also needs a refill to send into the [facility] in pharmacy regarding her Estring which she finds works very well for her vaginal dryness.      Healthy Habits:   Regular Exercise: Yes  Sunscreen Use: Yes  Healthy Diet: Yes  Dental Visits Regularly: Yes  Seat Belt: Yes  Sexually active: Yes  Self Breast Exam Monthly:Yes  Colonoscopy: Due 2020  Lipid Profile: Yes  Glucose Screen: N/A  Prevention of Osteoporosis: Yes  Last Dexa: 2018        Immunization History   Administered Date(s) Administered     Hep A, Adult IM (19yr & older) 10/25/2005, 10/28/2005, 04/11/2007     Hep A, historic 10/25/2005, 04/11/2007      Influenza S8g5-35, 2009     Influenza, Seasonal, Inj PF IIV3 2013     Influenza, inj, historic,unspecified 2015, 2017, 2018     Influenza, seasonal,quad inj 6-35 mos 10/01/2009     Td,adult,historic,unspecified 2009     Tdap 2009     ZOSTER, LIVE 2016     Immunization status: due today.    Gynecologic History  No LMP recorded. Patient is postmenopausal.  Contraception: postmenopausal  Last Pap: . Results were: normal  Last mammogram: . Results were: normal      OB History    Para Term  AB Living   2 2 2         SAB TAB Ectopic Multiple Live Births                  # Outcome Date GA Lbr Matthew/2nd Weight Sex Delivery Anes PTL Lv   2 Term            1 Term                   Current Outpatient Medications   Medication Sig Dispense Refill     ALBUTEROL INHL Inhale.       calcium carbonate (OS-DIMITRIS) 600 mg calcium (1,500 mg) tablet Take 600 mg by mouth 2 (two) times a day with meals.       cholecalciferol, vitamin D3, 5,000 unit Tab Take by mouth.       conjugated estrogens (PREMARIN) vaginal cream Insert into the vagina daily. Pt unaware of dosage 42.5 g 0     multivitamin (ONE A DAY) per tablet Take 1 tablet by mouth daily.       UNABLE TO FIND Med Name: Face treatment for pre cancer cells on face       vit B comp no.3-folic-C-biotin (NEPHRO-YNES) 1- mg-mg-mcg Tab tablet Take 1 tablet by mouth daily.       vitamin A-vitamin C-vit E-min (OCUVITE) Tab tablet Take by mouth daily.       estradiol (ESTRING) 2 mg (7.5 mcg /24 hour) vaginal ring Insert 2 mg into the vagina once for 1 dose. follow package directions 1 each 3     No current facility-administered medications for this visit.      No past medical history on file.  Past Surgical History:   Procedure Laterality Date     [place]  DELIVERY ONLY      Description:  Section;  Recorded: 2009;  Comments: X 2     RHINOPLASTY       Patient has no known allergies.  Family History    Problem Relation Age of Onset     Breast cancer Maternal Aunt      Cancer Maternal Uncle      Cancer Maternal Uncle      Colon cancer Sister 69        BRAF mutation     Breast cancer Maternal Aunt      Breast cancer Maternal Aunt      Breast cancer Maternal Aunt      Social History     Socioeconomic History     Marital status:      Spouse name: Not on file     Number of children: Not on file     Years of education: Not on file     Highest education level: Not on file   Occupational History     Not on file   Social Needs     Financial resource strain: Not on file     Food insecurity:     Worry: Not on file     Inability: Not on file     Transportation needs:     Medical: Not on file     Non-medical: Not on file   Tobacco Use     Smoking status: Former Smoker     Smokeless tobacco: Never Used   Substance and Sexual Activity     Alcohol use: Not on file     Drug use: Not on file     Sexual activity: Not on file   Lifestyle     Physical activity:     Days per week: Not on file     Minutes per session: Not on file     Stress: Not on file   Relationships     Social connections:     Talks on phone: Not on file     Gets together: Not on file     Attends Synagogue service: Not on file     Active member of club or organization: Not on file     Attends meetings of clubs or organizations: Not on file     Relationship status: Not on file     Intimate partner violence:     Fear of current or ex partner: Not on file     Emotionally abused: Not on file     Physically abused: Not on file     Forced sexual activity: Not on file   Other Topics Concern     Not on file   Social History Narrative     Not on file       Review of Systems  General:  Denies problem  Eyes: Denies problem  Ears/Nose/Throat: Denies problem  Cardiovascular: Denies problem  Respiratory:  Denies problem  Gastrointestinal:  Denies problem, Genitourinary: Denies problem  Musculoskeletal:  Denies problem  Skin: Denies problem  Neurologic: Denies  "problem  Psychiatric: Denies problem  Endocrine: Denies problem  Heme/Lymphatic: Denies problem   Allergic/Immunologic: Denies problem        Objective:         Vitals:    02/25/19 0805   BP: 122/68   Pulse: 74   Weight: 127 lb (57.6 kg)   Height: 5' 0.5\" (1.537 m)     Body mass index is 24.39 kg/m .    Physical Exam:  General Appearance: Alert, cooperative, no distress, appears stated age  Head: Normocephalic, without obvious abnormality, atraumatic  Eyes: PERRL, conjunctiva/corneas clear, EOM's intact  Ears: Normal TM's and external ear canals, both ears  Nose: Nares normal, septum midline,mucosa normal, no drainage  Throat: Lips, mucosa, and tongue normal; teeth and gums normal  Neck: Supple, symmetrical, trachea midline, no adenopathy;  thyroid: not enlarged, symmetric, no tenderness/mass/nodules; no carotid bruit or JVD  Back: Symmetric, no curvature, ROM normal, no CVA tenderness  Lungs: Clear to auscultation bilaterally, respirations unlabored  Breasts: No breast masses, tenderness, asymmetry, or nipple discharge.  Heart: Regular rate and rhythm, S1 and S2 normal, no murmur, rub, or gallop, Abdomen: Soft, non-tender, bowel sounds active all four quadrants,  no masses, no organomegaly  Pelvic:Normally developed genitalia with no external lesions or eruptions. Vagina and cervix show no lesions, inflammation, discharge or tenderness. No cystocele, No rectocele. Extremities: Extremities normal, atraumatic, no cyanosis or edema  Skin: Skin color, texture, turgor normal, no rashes or lesions  Lymph nodes: Cervical, supraclavicular, and axillary nodes normal  Neurologic: Normal        "

## 2021-06-28 NOTE — PROGRESS NOTES
Progress Notes by Yung Rose PA-C at 2019  3:50 PM     Author: Yung Rose PA-C Service: -- Author Type: Physician Assistant    Filed: 9/10/2019  5:14 PM Encounter Date: 2019 Status: Signed    : Yung Rose PA-C (Physician Assistant)       Subjective:      Patient ID: Lela Beckwith is a 59 y.o. female.    Chief Complaint:    HPI  Lela Beckwith is a 59 y.o. female who is right-hand dominant who presents today complaining of left wrist pain in the distal end of the radius and into the proximal row of carpals.  Patient states that she had fell backwards and broke her fall on an outstretched hand.  She immediately had pain and inability to go through full range of motion without pain.  This happened roughly at 3 PM this afternoon.  She is now presenting at 5 PM.  At this time she denies any loss of consciousness head neck back contralateral right upper extremity or bilateral lower extremity pain or pelvic pain to report.  She has not tried any treatment for this over-the-counter.  She denies break in the skin or deformity at the end of the radius.  However she has pain with supination and pronation and flexion extension of the wrist.      Past Medical History:   Diagnosis Date   ? Atrophic vaginitis 2018   ? Chronic Reflux Esophagitis     Created by Conversion    ? Fatty Liver     Created by Conversion    ? Hyperlipidemia     Created by Conversion    ? Hyperplastic Polyp Of The Large Intestine     Created by Conversion    ? Leiomyoma Of The Uterus     Created by Conversion    ? Menopause Has Occurred     Created by Conversion  Replacement Utility updated for latest IMO load   ? Mild intermittent asthma     Created by Conversion [facility] Annotation: Mar 30 2010 11:09AM Karo Tineo: excercise induced    ? Osteopenia 3/7/2016   ? Polyarthralgia 10/4/2018   ? Primary osteoarthritis of hands, bilateral 10/4/2018       Past Surgical History:   Procedure Laterality Date   ? NM  DELIVERY  ONLY      Description:  Section;  Recorded: 2009;  Comments: X 2   ? RHINOPLASTY         Family History   Problem Relation Age of Onset   ? Breast cancer Maternal Aunt    ? Cancer Maternal Uncle    ? Cancer Maternal Uncle    ? Colon cancer Sister 69        BRAF mutation   ? Breast cancer Maternal Aunt    ? Breast cancer Maternal Aunt    ? Breast cancer Maternal Aunt        Social History     Tobacco Use   ? Smoking status: Former Smoker   ? Smokeless tobacco: Never Used   Substance Use Topics   ? Alcohol use: Not on file   ? Drug use: Not on file       Review of Systems  As above in HPI, otherwise balance of Review of Systems are negative.    Objective:     /82 (Patient Site: Right Arm, Patient Position: Sitting, Cuff Size: Adult Regular)   Pulse 68   Temp 98  F (36.7  C) (Oral)   Resp 16   Wt 130 lb 1.6 oz (59 kg)   SpO2 99%   BMI 24.99 kg/m      Physical Exam  General: Patient is resting comfortably no acute distress is afebrile  HEENT: Head is normocephalic atraumatic   eyes are PERRL EOMI sclera anicteric   TMs are clear bilaterally  Throat is with mild pharyngeal wall erythema and no exudate  No cervical lymphadenopathy present  LUNGS: Clear to auscultation bilaterally  HEART: Regular rate and rhythm  Skin: Without rash non-diaphoretic    Imaging    Xr Wrist Left 3 Or More Vws    Result Date: 2019  EXAM: XR WRIST LEFT 3 OR MORE VWS LOCATION: [facility] DATE/TIME: 2019 4:06 PM INDICATION: Left wrist pain s/p fall on wrist COMPARISON: None. FINDINGS: Subtle nondisplaced, minimally impacted fracture of distal radial articular surface. The distal ulna and carpal bones remain intact. NOTE: ABNORMAL REPORT THE DICTATION ABOVE DESCRIBES AN ABNORMALITY FOR WHICH FOLLOW-UP IS NEEDED.       I personally reviewed xrays and noted a fracture of the distal radius.      Assessment:     Procedures    The primary encounter diagnosis was Left wrist pain. A diagnosis of Closed  "nondisplaced fracture of scaphoid of left wrist, unspecified portion of scaphoid, initial encounter was also pertinent to this visit.    Plan:     1. Left wrist pain  XR Wrist Left 3 or More VWS    Ambulatory referral to Orthopedics    CANCELED: Wrist/Arm DME:   2. Closed nondisplaced fracture of scaphoid of left wrist, unspecified portion of scaphoid, initial encounter  Ambulatory referral to Orthopedics    CANCELED: Wrist/Arm DME:       I had a long conversation with the patient.  I did state that although the x-rays show that there is a fracture in the distal radius and his intra-articular she does have pain over the scaphoid bone as well.  It may be possible that she also has a fracture into the scaphoid carpus.  This was explained to the patient.  I strongly suggest that she have a follow-up with orthopedics this afternoon to have imaging of her wrist with CT scan.  She can do this by following up in the [facility].  I offered to place the patient into a splint but the patient declined that stating that she \"had one at home\".  She is to continue with bracing and elevate the hand continuously above level of the heart.  Questions were answered to patient's satisfaction before discharge.    Patient Instructions   You have pain over your scaphoid bone.  You need to have a advanced imaging test called a CT scan to further evaluate the injury.  Follow-up with [facility] tomorrow for evaluation and treatment.  Use brace and elevate the hand above level of the heart continuously.  Protect the wrist from further trauma.      Patient Education     Possible Wrist Fracture  Follow up with your healthcare provider in one week, or as advised. This is to be sure the bone is healing properly.  If X-rays were taken, you will be told of any new findings that may affect your care.  You are very sore over a bone in your wrist called the navicular, or scaphoid, bone. This could be a sign of a hairline " fracture, or break, even though no fracture was seen on the X-ray. Therefore, a splint or cast will be applied until repeat X-rays are taken in about 1 to 2 weeks. If you have a hairline fracture, it will show up on the second X-ray and you will have to keep wearing a cast for about 6 to 20 weeks, depending on the location of the fracture. If no fracture is seen on the second X-ray, this means you only have a wrist sprain. The splint or cast can be removed.     Home care    Keep your arm raised to reduce pain and swelling. When sitting or lying down, raise your arm above the level of your heart. You can do this by placing your arm on a pillow that rests on your chest or on a pillow at your side. This is most important during the first 48 hours after injury.    Apply an ice pack over the injured area for no more than 15 to 20 minutes. Do this every 1 to 2 hours for the first 24 to 48 hours. To make an ice pack, put ice cubes in a plastic bag that seals at the top. Wrap the bag in a clean, thin towel or cloth. Never put ice or an ice pack directly on the skin. As the ice melts, be careful that the cast or splint doesnt get wet. You can place the ice pack inside the sling and directly over the splint or cast. Keep using ice packs as needed to ease pain and swelling.    Keep the cast or splint completely dry at all times. Bathe with your cast or splint out of the water. Protect it with 2 large plastic bags. Place 1 bag around the other. Tape each bag with duct tape at the top end. If a fiberglass cast or splint gets wet, you can dry it with a hair dryer on a cool setting.    You may use over-the-counter pain medicine to control pain, unless another pain medicine was prescribed. If you have chronic liver or kidney disease or ever had a stomach ulcer or GI (gastrointestinal) bleeding, talk with your provider before using these medicines.    If you smoke, try to quit. Tobacco use can interfere with the healing of this  fracture. It can also increase the risk of a complication needing surgery.  Follow-up care  Follow up with your healthcare provider in 1 week, or as advised. This is to be sure the bone is healing properly.  If X-rays were taken, you will be told of any new findings that may affect your care.  When to seek medical advice  Call your healthcare provider right away if any of the following occur:    The plaster cast or splint becomes wet or soft    The plaster cast or splint becomes loose    The fiberglass cast or splint remains wet for more than 24 hours    Increased tightness or pain occurs under the cast or splint    Fingers become swollen, cold, blue, numb, or tingly  Date Last Reviewed: 12/3/2015    7357-4006 [employer]. [address] [place] [address], [place] [address]. All rights reserved. This information is not intended as a substitute for professional medical care. Always follow your healthcare professional's instructions.

## 2021-07-13 ENCOUNTER — RECORDS - HEALTHEAST (OUTPATIENT)
Dept: ADMINISTRATIVE | Facility: CLINIC | Age: 61
End: 2021-07-13

## 2021-07-21 ENCOUNTER — RECORDS - HEALTHEAST (OUTPATIENT)
Dept: ADMINISTRATIVE | Facility: CLINIC | Age: 61
End: 2021-07-21

## 2021-07-22 ENCOUNTER — RECORDS - HEALTHEAST (OUTPATIENT)
Dept: FAMILY MEDICINE | Facility: CLINIC | Age: 61
End: 2021-07-22

## 2021-07-22 DIAGNOSIS — Z12.31 OTHER SCREENING MAMMOGRAM: ICD-10-CM

## 2021-08-10 DIAGNOSIS — E78.5 HYPERLIPIDEMIA, UNSPECIFIED HYPERLIPIDEMIA TYPE: ICD-10-CM

## 2021-08-10 RX ORDER — ATORVASTATIN CALCIUM 10 MG/1
10 TABLET, FILM COATED ORAL DAILY
Qty: 90 TABLET | Refills: 3 | Status: SHIPPED | OUTPATIENT
Start: 2021-08-10 | End: 2021-09-01

## 2021-08-10 NOTE — TELEPHONE ENCOUNTER
Reason for Call:  Patient is calling for a refill of    atorvastatin (LIPITOR) 10 MG tablet 90 tablet         SEND TO [place], [place]  Patient is out of medication.    Detailed comments: last seen 08/2020, has appt for sept 1, 2021. Is looking for a 30 day supply until appointment.  Also  Would like to get her lab done prior to appointment for fasting cholesterol. Appointment is late in the day.      Phone Number Patient can be reached at: Home number on file [phone] (home)    Best Time: any    Can we leave a detailed message on this number? YES    Call taken on 8/10/2021 at 8:35 AM by Daysi Jolley

## 2021-08-15 ENCOUNTER — HEALTH MAINTENANCE LETTER (OUTPATIENT)
Age: 61
End: 2021-08-15

## 2021-08-16 ENCOUNTER — HOSPITAL ENCOUNTER (OUTPATIENT)
Dept: MAMMOGRAPHY | Facility: CLINIC | Age: 61
Discharge: HOME | End: 2021-08-16
Attending: FAMILY MEDICINE | Admitting: FAMILY MEDICINE
Payer: COMMERCIAL

## 2021-08-16 DIAGNOSIS — Z12.31 VISIT FOR SCREENING MAMMOGRAM: ICD-10-CM

## 2021-08-16 PROCEDURE — 77063 BREAST TOMOSYNTHESIS BI: CPT

## 2021-09-01 ENCOUNTER — OFFICE VISIT (OUTPATIENT)
Dept: FAMILY MEDICINE | Facility: CLINIC | Age: 61
End: 2021-09-01
Payer: COMMERCIAL

## 2021-09-01 VITALS
RESPIRATION RATE: 16 BRPM | DIASTOLIC BLOOD PRESSURE: 62 MMHG | TEMPERATURE: 98 F | SYSTOLIC BLOOD PRESSURE: 110 MMHG | WEIGHT: 124.2 LBS | OXYGEN SATURATION: 98 % | HEIGHT: 60 IN | HEART RATE: 72 BPM | BODY MASS INDEX: 24.39 KG/M2

## 2021-09-01 DIAGNOSIS — E78.5 HYPERLIPIDEMIA, UNSPECIFIED HYPERLIPIDEMIA TYPE: ICD-10-CM

## 2021-09-01 DIAGNOSIS — Z00.00 ROUTINE GENERAL MEDICAL EXAMINATION AT A HEALTH CARE FACILITY: Primary | ICD-10-CM

## 2021-09-01 DIAGNOSIS — J45.20 MILD INTERMITTENT ASTHMA WITHOUT COMPLICATION: ICD-10-CM

## 2021-09-01 DIAGNOSIS — Z78.0 ASYMPTOMATIC POSTMENOPAUSAL STATUS: ICD-10-CM

## 2021-09-01 DIAGNOSIS — M21.70 LEG LENGTH DISCREPANCY: ICD-10-CM

## 2021-09-01 DIAGNOSIS — M25.552 HIP PAIN, LEFT: ICD-10-CM

## 2021-09-01 DIAGNOSIS — N95.2 ATROPHIC VAGINITIS: ICD-10-CM

## 2021-09-01 DIAGNOSIS — M85.80 OSTEOPENIA, UNSPECIFIED LOCATION: ICD-10-CM

## 2021-09-01 PROCEDURE — 99214 OFFICE O/P EST MOD 30 MIN: CPT | Mod: 25 | Performed by: FAMILY MEDICINE

## 2021-09-01 PROCEDURE — 96127 BRIEF EMOTIONAL/BEHAV ASSMT: CPT | Performed by: FAMILY MEDICINE

## 2021-09-01 PROCEDURE — 99396 PREV VISIT EST AGE 40-64: CPT | Performed by: FAMILY MEDICINE

## 2021-09-01 RX ORDER — ATORVASTATIN CALCIUM 10 MG/1
10 TABLET, FILM COATED ORAL DAILY
Qty: 90 TABLET | Refills: 3 | Status: SHIPPED | OUTPATIENT
Start: 2021-09-01 | End: 2022-12-01

## 2021-09-01 ASSESSMENT — ASTHMA QUESTIONNAIRES
QUESTION_1 LAST FOUR WEEKS HOW MUCH OF THE TIME DID YOUR ASTHMA KEEP YOU FROM GETTING AS MUCH DONE AT WORK, SCHOOL OR AT HOME: NONE OF THE TIME
ACT_TOTALSCORE: 25
QUESTION_2 LAST FOUR WEEKS HOW OFTEN HAVE YOU HAD SHORTNESS OF BREATH: NOT AT ALL
QUESTION_3 LAST FOUR WEEKS HOW OFTEN DID YOUR ASTHMA SYMPTOMS (WHEEZING, COUGHING, SHORTNESS OF BREATH, CHEST TIGHTNESS OR PAIN) WAKE YOU UP AT NIGHT OR EARLIER THAN USUAL IN THE MORNING: NOT AT ALL
QUESTION_4 LAST FOUR WEEKS HOW OFTEN HAVE YOU USED YOUR RESCUE INHALER OR NEBULIZER MEDICATION (SUCH AS ALBUTEROL): NOT AT ALL
QUESTION_5 LAST FOUR WEEKS HOW WOULD YOU RATE YOUR ASTHMA CONTROL: COMPLETELY CONTROLLED

## 2021-09-01 ASSESSMENT — MIFFLIN-ST. JEOR: SCORE: 1043.84

## 2021-09-01 NOTE — PROGRESS NOTES
SUBJECTIVE:   CC: Lela Beckwith is an 61 year old woman who presents for preventive health visit.     Patient has been advised of split billing requirements and indicates understanding: Yes  Healthy Habits:     Getting at least 3 servings of Calcium per day:  Yes    Bi-annual eye exam:  Yes    Dental care twice a year:  Yes    Sleep apnea or symptoms of sleep apnea:  Daytime drowsiness    Diet:  Regular (no restrictions)    Frequency of exercise:  4-5 days/week    Duration of exercise:  45-60 minutes    Taking medications regularly:  No    Barriers to taking medications:  Problems remembering to take them    Medication side effects:  None    PHQ-2 Total Score: 1    Additional concerns today:  Yes      Today's PHQ-2 Score:   PHQ-2 ( 1999 [employer]) 9/1/2021   Q1: Little interest or pleasure in doing things 0   Q2: Feeling down, depressed or hopeless 1   PHQ-2 Score 1   Q1: Little interest or pleasure in doing things Not at all   Q2: Feeling down, depressed or hopeless Several days   PHQ-2 Score 1       Abuse: Current or Past (Physical, Sexual or Emotional) - No  Do you feel safe in your environment? Yes        Social History     Tobacco Use     Smoking status: Former Smoker     Smokeless tobacco: Never Used   Substance Use Topics     Alcohol use: Not on file     If you drink alcohol do you typically have >3 drinks per day or >7 drinks per week? No    Alcohol Use 9/1/2021   Prescreen: >3 drinks/day or >7 drinks/week? No   Prescreen: >3 drinks/day or >7 drinks/week? -       Reviewed orders with patient.  Reviewed health maintenance and updated orders accordingly - Yes  Lab work is in process    Breast Cancer Screening:  Any new diagnosis of family breast, ovarian, or bowel cancer? No    FHS-7:   Breast CA Risk Assessment (FHS-7) 8/16/2021   Did any of your first-degree relatives have breast or ovarian cancer? No   Did any of your relatives have bilateral breast cancer? No   Did any man in your family have breast cancer?  No   Did any woman in your family have breast and ovarian cancer? No   Did any woman in your family have breast cancer before age 50 y? Yes   Do you have 2 or more relatives with breast and/or ovarian cancer? Yes   Do you have 2 or more relatives with breast and/or bowel cancer? Yes     click delete button to remove this line now  Mammogram Screening: Recommended mammography every 1-2 years with patient discussion and risk factor consideration  Pertinent mammograms are reviewed under the imaging tab.    History of abnormal Pap smear: NO - age 30-65 PAP every 5 years with negative HPV co-testing recommended  PAP / HPV Latest Ref Rng & Units 2/25/2019 11/11/2014   PAP Negative for squamous intraepithelial lesion or malignancy. Negative for squamous intraepithelial lesion or malignancy  Electronically signed by Tarsha Jean CT (ASCP) on 3/1/2019 at  8:43 AM   Negative for squamous intraepithelial lesion or malignancy  Electronically signed by Allison Padron CT (ASCP) on 11/20/2014 at  3:48 PM     HPV16 NEG Negative -   HPV18 NEG Negative -   HRHPV NEG Negative -     Reviewed and updated as needed this visit by clinical staff  Tobacco  Allergies  Meds              Reviewed and updated as needed this visit by Provider                    Review of Systems  CONSTITUTIONAL: NEGATIVE for fever, chills, change in weight  INTEGUMENTARY/SKIN: NEGATIVE for worrisome rashes, moles or lesions  EYES: NEGATIVE for vision changes or irritation  ENT: NEGATIVE for ear, mouth and throat problems  RESP: NEGATIVE for significant cough or SOB  BREAST: NEGATIVE for masses, tenderness or discharge  CV: NEGATIVE for chest pain, palpitations or peripheral edema  GI: NEGATIVE for nausea, abdominal pain, heartburn, or change in bowel habits  : NEGATIVE for unusual urinary or vaginal symptoms. No vaginal bleeding.  MUSCULOSKELETAL: NEGATIVE for significant arthralgias or myalgia  NEURO: NEGATIVE for weakness, dizziness or  "paresthesias  PSYCHIATRIC: NEGATIVE for changes in mood or affect      OBJECTIVE:   /62 (BP Location: Left arm, Patient Position: Sitting, Cuff Size: Adult Regular)   Pulse 72   Temp 98  F (36.7  C) (Oral)   Resp 16   Ht 1.53 m (5' 0.25\")   Wt 56.3 kg (124 lb 3.2 oz)   SpO2 98%   BMI 24.06 kg/m    Physical Exam  GENERAL APPEARANCE: healthy, alert and no distress  EYES: Eyes grossly normal to inspection, PERRL and conjunctivae and sclerae normal  HENT: ear canals and TM's normal, nose and mouth without ulcers or lesions, oropharynx clear and oral mucous membranes moist  NECK: no adenopathy, no asymmetry, masses, or scars and thyroid normal to palpation  RESP: lungs clear to auscultation - no rales, rhonchi or wheezes  BREAST: normal without masses, tenderness or nipple discharge and no palpable axillary masses or adenopathy  CV: regular rate and rhythm, normal S1 S2, no S3 or S4, no murmur, click or rub, no peripheral edema and peripheral pulses strong  ABDOMEN: soft, nontender, no hepatosplenomegaly, no masses and bowel sounds normal  MS: no musculoskeletal defects are noted and gait is age appropriate without ataxia  SKIN: no suspicious lesions or rashes  NEURO: Normal strength and tone, sensory exam grossly normal, mentation intact and speech normal  PSYCH: mentation appears normal and affect normal/bright    Diagnostic Test Results:  Labs reviewed in Epic    ASSESSMENT/PLAN:     Problem List Items Addressed This Visit        Respiratory    Mild intermittent asthma     Well controlled and uses albuterol PRN            Endocrine    Hyperlipidemia     Continues on a atorvastatin 10 mg daily.  The patient and I discussed hormonal placement therapy as it relates to some of her menopausal symptoms as well as the benefits to address her osteopenia.  Because of some family history of cardiovascular disease, we also talked about a CT coronary calcium score prior to initiating HRT in order to evaluate relative " "risk better.         Relevant Medications    atorvastatin (LIPITOR) 10 MG tablet    Other Relevant Orders    Lipid Profile (Completed)    CT Coronary Calcium Scan    Comprehensive metabolic panel (Completed)       Musculoskeletal and Integumentary    Osteopenia     The patient had a DEXA scan last year which showed she was at moderate risk of fracture overall.  She does supplement calcium and vitamin D on a daily basis.  The patient asked about HRT as a possible adjunct of treatment to support her bone health.  We discussed that this is not first-line treatment but that it certainly can be effective.  We discussed the indication of starting an active medication such as a bisphosphonate if she becomes high risk of fracture and a plan to do a follow-up bone density test next year.            Urinary    Atrophic vaginitis     The patient continues on Premarin cream for this.           Other Visit Diagnoses     Routine general medical examination at a health care facility    -  Primary    Asymptomatic postmenopausal status        Relevant Medications    conjugated estrogens (PREMARIN) 0.625 MG/GM vaginal cream    Hip pain, left        Relevant Orders    Physical Therapy Referral    Leg length discrepancy        Relevant Orders    Physical Therapy Referral          Patient has been advised of split billing requirements and indicates understanding: Yes  COUNSELING:  Reviewed preventive health counseling, as reflected in patient instructions       Regular exercise       Healthy diet/nutrition       Osteoporosis prevention/bone health       Colon cancer screening       Advance Care Planning    Estimated body mass index is 24.06 kg/m  as calculated from the following:    Height as of this encounter: 1.53 m (5' 0.25\").    Weight as of this encounter: 56.3 kg (124 lb 3.2 oz).        She reports that she has quit smoking. She has never used smokeless tobacco.      Counseling Resources:  ATP IV Guidelines  Pooled Cohorts " Equation Calculator  Breast Cancer Risk Calculator  BRCA-Related Cancer Risk Assessment: FHS-7 Tool  FRAX Risk Assessment  ICSI Preventive Guidelines  Dietary Guidelines for Americans, 2010  USDA's MyPlate  ASA Prophylaxis  Lung CA Screening    KATIE ZUÑIGA  [facility]

## 2021-09-02 ENCOUNTER — APPOINTMENT (OUTPATIENT)
Dept: LAB | Facility: CLINIC | Age: 61
End: 2021-09-02
Payer: COMMERCIAL

## 2021-09-02 ASSESSMENT — ASTHMA QUESTIONNAIRES: ACT_TOTALSCORE: 25

## 2021-09-02 NOTE — ASSESSMENT & PLAN NOTE
The patient had a DEXA scan last year which showed she was at moderate risk of fracture overall.  She does supplement calcium and vitamin D on a daily basis.  The patient asked about HRT as a possible adjunct of treatment to support her bone health.  We discussed that this is not first-line treatment but that it certainly can be effective.  We discussed the indication of starting an active medication such as a bisphosphonate if she becomes high risk of fracture and a plan to do a follow-up bone density test next year.

## 2021-09-02 NOTE — ASSESSMENT & PLAN NOTE
Continues on a atorvastatin 10 mg daily.  The patient and I discussed hormonal placement therapy as it relates to some of her menopausal symptoms as well as the benefits to address her osteopenia.  Because of some family history of cardiovascular disease, we also talked about a CT coronary calcium score prior to initiating HRT in order to evaluate relative risk better.

## 2021-09-08 ENCOUNTER — HOSPITAL ENCOUNTER (OUTPATIENT)
Dept: CT IMAGING | Facility: CLINIC | Age: 61
Discharge: HOME | End: 2021-09-08
Attending: FAMILY MEDICINE | Admitting: FAMILY MEDICINE
Payer: COMMERCIAL

## 2021-09-08 DIAGNOSIS — E78.5 HYPERLIPIDEMIA, UNSPECIFIED HYPERLIPIDEMIA TYPE: ICD-10-CM

## 2021-09-08 PROCEDURE — 75571 CT HRT W/O DYE W/CA TEST: CPT

## 2021-09-08 PROCEDURE — 75571 CT HRT W/O DYE W/CA TEST: CPT | Mod: 26 | Performed by: GENERAL ACUTE CARE HOSPITAL

## 2021-09-09 LAB
CV CALCIUM SCORE AGATSTON LM: 0
CV CALCIUM SCORING AGATSON LAD: 0
CV CALCIUM SCORING AGATSTON CX: 0
CV CALCIUM SCORING AGATSTON RCA: 0
CV CALCIUM SCORING AGATSTON TOTAL: 0

## 2021-09-13 ENCOUNTER — MYC MEDICAL ADVICE (OUTPATIENT)
Dept: FAMILY MEDICINE | Facility: CLINIC | Age: 61
End: 2021-09-13

## 2021-09-13 DIAGNOSIS — Z80.0 FAMILY HISTORY OF COLON CANCER: ICD-10-CM

## 2021-09-13 DIAGNOSIS — Z80.3 FAMILY HISTORY OF MALIGNANT NEOPLASM OF BREAST: Primary | ICD-10-CM

## 2021-09-25 ENCOUNTER — HEALTH MAINTENANCE LETTER (OUTPATIENT)
Age: 61
End: 2021-09-25

## 2021-10-15 ENCOUNTER — TRANSFERRED RECORDS (OUTPATIENT)
Dept: HEALTH INFORMATION MANAGEMENT | Facility: CLINIC | Age: 61
End: 2021-10-15

## 2021-11-02 ENCOUNTER — VIRTUAL VISIT (OUTPATIENT)
Dept: ONCOLOGY | Facility: CLINIC | Age: 61
End: 2021-11-02
Attending: FAMILY MEDICINE
Payer: COMMERCIAL

## 2021-11-02 DIAGNOSIS — Z80.3 FAMILY HISTORY OF MALIGNANT NEOPLASM OF BREAST: Primary | ICD-10-CM

## 2021-11-02 DIAGNOSIS — Z80.0 FAMILY HISTORY OF COLON CANCER: ICD-10-CM

## 2021-11-02 DIAGNOSIS — Z84.89 FAMILY HISTORY OF NEOPLASM OF BRAIN: ICD-10-CM

## 2021-11-02 DIAGNOSIS — Z83.2 FAMILY HISTORY OF HEMATOLOGIC DISORDER: ICD-10-CM

## 2021-11-02 DIAGNOSIS — Z80.42 FAMILY HX OF PROSTATE CANCER: ICD-10-CM

## 2021-11-02 DIAGNOSIS — Z80.6 FAMILY HISTORY OF LEUKEMIA: ICD-10-CM

## 2021-11-02 DIAGNOSIS — Z80.8: ICD-10-CM

## 2021-11-02 DIAGNOSIS — Z80.7 FAMILY HISTORY OF MULTIPLE MYELOMA: ICD-10-CM

## 2021-11-02 PROCEDURE — 96040 HC GENETIC COUNSELING, EACH 30 MINUTES: CPT | Mod: GT,95 | Performed by: GENETIC COUNSELOR, MS

## 2021-11-02 NOTE — LETTER
"    Cancer Risk Management  Program [facility]  [facility]  Mailing Address  Cancer Risk Management Program  [address]  [place] [address]    New patient appointments  [phone]  November 3, 2021    Lela Beckwith  [address] [address]      Dear Lela,    It was a pleasure speaking with you on November 3, 2021. Here is a copy of the general \"after visit summary\" from our conversation.  If you have any additional questions, please feel free to call.  You will also later receive in the mail a more detailed clinic note of our conversation as well.        Assessing Cancer Risk  Only about 5-10% of cancers are thought to be due to an inherited cancer susceptibility gene.    These families often have:    Several people with the same or related types of cancer    Cancers diagnosed at a young age (before age 50)    Individuals with more than one primary cancer    Multiple generations of the family affected with cancer    Some people may be candidates for genetic testing of more than one gene.  For these families, genetic testing using a cancer panel may be offered.  These panels will test different genes known to increase the risk for breast, ovarian, uterine, and/or other cancers. All of the genes discussed below have published clinical management guidelines for individuals who are found to carry a mutation. The purpose of this handout is to serve as a brief summary of the genes analyzed by the panels used to inquire about hereditary breast and gynecologic cancer:  BRIAN, BRCA1, BRCA2, BRIP1, CDH1, CHEK2, MLH1, MSH2, MSH6, PMS2, EPCAM, PTEN, PALB2, RAD51C, RAD51D, and TP53.  ______________________________________________________________________________  Hereditary Breast and Ovarian Cancer " Syndrome   (BRCA1 and BRCA2)  A single mutation in one of the copies of BRCA1 or BRCA2 increases the risk for breast and ovarian cancer, among others.  The risk for pancreatic cancer and melanoma may also be slightly increased in some families.  The chart below shows the chance that someone with a BRCA mutation would develop cancer in his or her lifetime1,2,3,4.        A person s ethnic background is also important to consider, as individuals of Ashkenazi Adventism ancestry have a higher chance of having a BRCA gene mutation.  There are three BRCA mutations that occur more frequently in this population.    Manzo Syndrome   (MLH1, MSH2, MSH6, PMS2, and EPCAM)  Currently five genes are known to cause Manzo Syndrome: MLH1, MSH2, MSH6, PMS2, and EPCAM.  A single mutation in one of the Manzo Syndrome genes increases the risk for colon, endometrial, ovarian, and stomach cancers.  Other cancers that occur less commonly in Manzo Syndrome include urinary tract, skin, and brain cancers.  The chart below shows the chance that a person with Manzo syndrome would develop cancer in his or her lifetime5.      *Cancer risk varies depending on Manzo syndrome gene found    Cowden Syndrome   (PTEN)  Cowden syndrome is a hereditary condition that increases the risk for breast, thyroid, endometrial, colon, and kidney cancer.  Cowden syndrome is caused by a mutation in the PTEN gene.  A single mutation in one of the copies of PTEN causes Cowden syndrome and increases cancer risk.  The chart below shows the chance that someone with a PTEN mutation would develop cancer in their lifetime6,7.  Other benign features seen in some individuals with Cowden syndrome include benign skin lesions (facial papules, keratoses, lipomas), learning disability, autism, thyroid nodules, colon polyps, and larger head size.      *One recent study found breast cancer risk to be increased to 85%    Li-Fraumeni Syndrome   (TP53)  Li-Fraumeni Syndrome (LFS) is a  cancer predisposition syndrome caused by a mutation in the TP53 gene. A single mutation in one of the copies of TP53 increases the risk for multiple cancers. Individuals with LFS are at an increased risk for developing cancer at a young age. The lifetime risk for development of a LFS-associated cancer is 50% by age 30 and 90% by age 60.   Core Cancers: Sarcomas, Breast, Brain, Lung, Leukemias/Lymphomas, Adrenocortical carcinomas  Other Cancers: Gastrointestinal, Thyroid, Skin, Genitourinary    Hereditary Diffuse Gastric Cancer   (CDH1)  Currently, one gene is known to cause hereditary diffuse gastric cancer (HDGC): CDH1.  Individuals with HDGC are at increased risk for diffuse gastric cancer and lobular breast cancer. Of people diagnosed with HDGC, 30-50% have a mutation in the CDH1 gene.  This suggests there are likely other genes that may cause HDGC that have not been identified yet.      Lifetime Cancer Risks    General Population HDGC    Diffuse Gastric  <1% ~80%   Breast 12% 39-52%         Additional Genes  BRIAN  BRIAN is a moderate-risk breast cancer gene. Women who have a mutation in BRIAN can have between a 2-4 fold increased risk for breast cancer compared to the general population8. BRIAN mutations have also been associated with increased risk for pancreatic cancer, however an estimate of this cancer risk is not well understood9. Individuals who inherit two BRIAN mutations have a condition called ataxia-telangiectasia (AT).  This rare autosomal recessive condition affects the nervous system and immune system, and is associated with progressive cerebellar ataxia beginning in childhood.  Individuals with ataxia-telangiectasia often have a weakened immune system and have an increased risk for childhood cancers.    PALB2  Mutations in PALB2 have been shown to increase the risk of breast cancer up to 33-58% in some families; where individuals fall within this risk range is dependent upon family [id number]. PALB2  mutations have also been associated with increased risk for pancreatic cancer, although this risk has not been quantified yet.  Individuals who inherit two PALB2 mutations--one from their mother and one from their father--have a condition called Fanconi Anemia.  This rare autosomal recessive condition is associated with short stature, developmental delay, bone marrow failure, and increased risk for childhood cancers.    CHEK2   CHEK2 is a moderate-risk breast cancer gene.  Women who have a mutation in CHEK2 have around a 2-fold increased risk for breast cancer compared to the general population, and this risk may be higher depending upon family history.11,12,13 Mutations in CHEK2 have also been shown to increase the risk of a number of other cancers, including colon and prostate, however these cancer risks are currently not well understood.    BRIP1, RAD51C and RAD51D  Mutations in BRIP1, RAD51C, and RAD51D have been shown to increase the risk of ovarian cancer and possibly female breast cancer as well14,15 .       Lifetime Cancer Risk    General Population BRIP1 RAD51C RAD51D   Ovarian 1-2% ~5-8% ~5-9% ~7-15%           Inheritance  All of the cancer syndromes reviewed above are inherited in an autosomal dominant pattern.  This means that if a parent has a mutation, each of his or her children will have a 50% chance of inheriting that same mutation.  Therefore, each child--male or female--would have a 50% chance of being at increased risk for developing cancer.      Image obtained from Genetics Home Reference, 2013     Mutations in some genes can occur de eladio, which means that a person s mutation occurred for the first time in them and was not inherited from a parent.  Now that they have the mutation, however, it can be passed on to future generations.    Genetic Testing  Genetic testing involves a blood test and will look at the genetic information in the BRIAN, BRCA1, BRCA2, BRIP1, CDH1, CHEK2, MLH1, MSH2, MSH6,  PMS2, EPCAM, PTEN, PALB2, RAD51C, RAD51D, and TP53 genes for any harmful mutations that are associated with increased cancer risk.  If possible, it is recommended that the person(s) who has had cancer be tested before other family members.  That person will give us the most useful information about whether or not a specific gene is associated with the cancer in the family.    Results  There are three possible results of genetic testing:    Positive--a harmful mutation was identified in one or more of the genes    Negative--no mutation was identified in any of the genes on this panel    Variant of unknown significance--a variation in one of the genes was identified, but it is unclear how this impacts cancer risk in the family    Advantages and Disadvantages   There are advantages and disadvantages to genetic testing.    Advantages    May clarify your cancer risk    Can help you make medical decisions    May explain the cancers in your family    May give useful information to your family members (if you share your results)    Disadvantages    Possible negative emotional impact of learning about inherited cancer risk    Uncertainty in interpreting a negative test result in some situations    Possible genetic discrimination concerns (see below)    Genetic Information Nondiscrimination Act (DEVORAH)  DEVORAH is a federal law that protects individuals from health insurance or employment discrimination based on a genetic test result alone.  Although rare, there are currently no legal discrimination protections in terms of life insurance, long term care, or disability insurances.  Visit the National Human Genome Research [place] website to learn more.    Reducing Cancer Risk  All of the genes described above have nationally recognized cancer screening guidelines that would be recommended for individuals who test positive.  In addition to increased cancer screening, surgeries may be offered or recommended to reduce cancer risk.   Recommendations are based upon an individual s genetic test result as well as their personal and family history of cancer.    Questions to Think About Regarding Genetic Testing:    What effect will the test result have on me and my relationship with my family members if I have an inherited gene mutation?  If I don t have a gene mutation?    Should I share my test results, and how will my family react to this news, which may also affect them?    Are my children ready to learn new information that may one day affect their own health?    Hereditary Cancer Resources    FORCE: Facing Our Risk of Cancer Empowered facingourrisk.org   Bright Pink bebrightpink.org   Li-Fraumeni Syndrome Association lfsassociation.org   PTEN World PTENworld.com   No stomach for cancer, Inc. nostomachforcancer.org   Stomach cancer relief network Scrnet.org   Collaborative Group of the Americas on Inherited Colorectal Cancer (CGA) cgaicc.com    Cancer Care cancercare.org   American Cancer Society (ACS) cancer.org   National Cancer [place] (NCI) cancer.gov     Please call us if you have any questions or concerns.   Cancer Risk Management Program [phone] ([phone])  ? Rafael Brady, MS, [facility] [phone]  X   Felicita Jenkins, MS, [facility]  [phone]    [email or web address]  ? Kimberly Bob, MS, [facility]  [phone]  ? Urvashi Kendrick, MS, [facility] [phone]  ? Trish Kirby, MS, [facility] [phone]  ? Blanka Steve, MS, [facility]  [phone]  ? Marybel rOta, MS  [phone]    References  1. Donavan OrtizP, Saadia S, Edmund LI, José Miguel JE, Rafael JL, John N, Sandra H, Andrey O, Yulia A, Semaj B, Cayetano P, Mustapha S, Breonna DM, Bc N, Adrian E, Myesha H, Fly E, Alexsander J, Kayla J, Josephine B, Manuel H, Thorlaci S, Eerola H, Aziza H, Skylar K, Deanne OP. Average risks of breast and ovarian cancer associated with BRCA1 or BRCA2 mutations detected in case series unselected for family history: a combined  analysis of 222 studies. Am J Hum Christina. 2003;72:1117-30.  2. Crow N, Sana M, Noam G.  BRCA1 and BRCA2 Hereditary Breast and Ovarian Cancer. Gene Reviews online. 2013.  3. Heriberto YC, Patria S, Dione G, Yael S. Breast cancer risk among male BRCA1 and BRCA2 mutation carriers. J Natl Cancer Inst. 2007;99:1811-4.  4. Yobani GARNER, Erwin I, Dereck J, Megan E, Aleyda ER, Shayne F. Risk of breast cancer in male BRCA2 carriers. J Med Christina. 2010;47:710-1.  5. National Comprehensive Cancer Network. Clinical practice guidelines in oncology, colorectal cancer screening. Available online (registration required). 2015.  6. Carlos MH, Migel J, Chrissie J, Marcus OJEDA, Dougie MS, Carla C. Lifetime cancer risks in individuals with germline PTEN mutations. Clin Cancer Res. 2012;18:400-7.  7. Pilarski R. Cowden Syndrome: A Critical Review of the Clinical Literature. J Christina . 2009:18:13-27.  8. Isidro A, Harrison D, Susie S, Carla P, Chagnickyi T, Estefania M, Farzad B, Jakian H, Kirsty R, Lilliam K, Eleazar L, Yobani GARNER, Breonna D, Marko DF, Seamus MR, The Breast Cancer Susceptibility Collaboration (UK) & Ruperto N. BRIAN mutations that cause ataxia-telangiectasia are breast cancer susceptibility alleles. Nature Genetics. 2006;38:873-875  9. Alfonso N , Michaela Y, Melissa J, Mitchell L, Gal GM , Jasmeet ML, Candidoinger S, Mccoy AG, Syngal S, Berny ML, Chas J , Mili R, Mya SZ, Daniela JR, Bertha VE, Shweta M, Vogelstein B, Naga N, Dia RH, Azael KW, and Doug AP. BRIAN mutations in patients with hereditary pancreatic cancer. Cancer Discover. 2012;2:41-46  10. Warren JAIMES, et al. Breast-Cancer Risk in Families with Mutations in PALB2. NEJM. 2014; 371(6):497-506.  11. CHEK2 Breast Cancer Case-Control Consortium. CHEK2*1100delC and susceptibility to breast cancer: A collaborative analysis involving 10,860 breast cancer cases and 9,065 controls from 10 studies. Am J Hum Christina, 74 (2004), pp.  8015-9575  12. Ivania T, Arnulfo S, Garrick K, et al. Spectrum of Mutations in BRCA1, BRCA2, CHEK2, and TP53 in Families at High Risk of Breast Cancer. ADARSH. 2006;295(12):9078-0192.   13. Thai C, Toyin D, Yadi RODRATE, et al. Risk of breast cancer in women with a CHEK2 mutation with and without a family history of breast cancer. J Clin Oncol. 2011;29:4671-0970.  14. Get H, Peter E, Naresh SJ, et al. Contribution of germline mutations in the RAD51B, RAD51C, and RAD51D genes to ovarian cancer in the population. J Clin Oncol. 2015;33(26):2575-2058. Doi:10.1200/JCO.2015.61.2408.  15. James HU, Michael DF, Justice P, et al. Mutations in BRIP1 confer high risk of ovarian cancer. Pascale Christina. 2011;43(11):8665-0596. doi:10.1038/ng.955.

## 2021-11-02 NOTE — LETTER
11/2/2021         RE: Lela Beckwith  [address] [facility] [address]        Dear Colleague,    Thank you for referring your patient, Lela Beckwith, to the [facility]. Please see a copy of my visit note below.    Cancer Risk Management Program Genetic Counseling Note    11/2/2021    Referring Provider: Dr. Jeny Baron    Presenting Information:   I met with Lela Beckwith today for genetic counseling through the Cancer Risk Management Program, in order to discuss her family history of breast, colon, prostate, and other cancer.  She presents today to review this history, cancer screening recommendations, and available genetic testing options.  This consultation took place via a video visit due to COVID-19 restrictions (see attestation below).    Personal History:  Lela is a 61 year old female.  She does not have any personal history of invasive cancer; she does report having a Mohs procedure in 2018 for a basal cell skin cancer near her right eye.  In other medical history, Lela is reported to have hyperlipidemia and osteopenia.  It  is also reported in her chart that she has a history of joint pain and a past history of asthma.    With respect to her hormone-based medical history, Lela reports that she had her first menstrual period around age 13 and underwent menopause around age 53-55.  She reports that she took oral contraceptives for a time in her late teens and early 20s. She states that she has used Premarin cream in the past. She reports that she had her first child at around age 30.   She has her ovaries, uterus, and fallopian tubes intact.    With respect to cancer screening, Lela had a mammogram in August of this year which was negative.  She reports no history of previous abnormal mammograms or breast biopsies.  Lela had a colonoscopy in 2015, which was negative.  A follow-up colonoscopy in 2020 noted 3 tubular adenomas; it was recommended that she have a follow-up  colonoscopy in 3 years time.    Lela reports that she smoked cigarettes for about 10 years in the past.  She states that she has no personal history of chronic, excessive alcohol use.  Lela states that she is not aware of any other personal history of any specific, potentially concerning environmental exposures with respect to cancer risk.    Family History: (Please see scanned pedigree for detailed family history information)    Lela reports that she had a sister that was diagnosed with colon cancer at age 71 and, unfortunately,  at age 72 from complications of that cancer.  Lela reports that this sister was also noted to have an adrenal tumor, which was thought to be unrelated to her colon cancer but was not fully assessed before her death.      Lela reports that the only cancer she is aware of on her father's side of the family is a paternal first cousin with a head and neck cancer; she reports that this individual did have a history of smoking.    Lela reports that she has 3 maternal aunts with breast cancer: one that  from this disease at age 60, one that  of this disease at age 71, and another that  of this disease at age 73.    Lela reports that she has another maternal aunt that  of lung cancer at age 74; Lela reports that she is not aware of any history of smoking in that individual.    Lela reports that she had a paternal uncle that was diagnosed with both prostate cancer and multiple myeloma at an later age; she reports that he  of complications of cancer at age 83.    Lela states that she has another maternal uncle that  of leukemia at age 61.    Lela reports that she has a maternal first-cousin that was diagnosed with breast cancer in her 60s.      Lela also reports a maternal first cousin who has a history of a brain tumor and who also had a daughter with a brain tumor.    Lela reports that her family is primarily of [place] ancestry.  There is no known Ashkenazi Rastafarian  ancestry on either side of her family. There is no reported consanguinity.    Discussion:    We reviewed the features of sporadic, familial, and hereditary cancers. In looking at Lela's family history, it is possible that a cancer susceptibility gene is present due to her family history of multiple family members with breast cancer, affecting two generations of the family.  We talked about that some of the other cancers seen in Lela's family (e.g. prostate cancer, leukemia, colon cancer) can be related to breast cancer in some hereditary cancer syndromes.      We discussed the natural history and genetics of hereditary cancer syndromes associated with breast (and other) cancers. A detailed handout regarding some of these hereditary cancer syndromes and the information we discussed was provided to Lela at the end of our appointment today and can be found in the after visit summary. Topics included: inheritance pattern, cancer risks, cancer screening recommendations, and also risks, benefits and limitations of testing.      Based on her family history, Lela meets current National Comprehensive Cancer Network (NCCN) criteria for genetic testing of high-penetrance breast and/or ovarian cancer susceptibility genes (including BRCA1, BRCA2, CDH1, PALB2, PTEN, and TP53), because of her family history of 4 close relatives with breast cancer.        We discussed that there are additional genes that could cause increased risk for breast (and other) cancer. As many of these genes present with overlapping features in a family and accurate cancer risk cannot always be established based upon the pedigree analysis alone, it would be reasonable for Lela to consider panel genetic testing to analyze multiple genes at once.      Lela stated that she was interested in pursuing genetic testing through a panel of genes associated with hereditary cancer syndromes, and so we reviewed the various panel options and their potential benefits  and limitations.  After this conversation, Lela decided that she was interested in the Common Hereditary Cancer genetic testing panel (plus additional adrenal tumor genes).      The Common Hereditary Cancers genetic testing panel through [facility] includes analysis for 47 genes associated with cancers of the breast, ovary, uterus, prostate and gastrointestinal system: APC, BRIAN, AXIN2, BARD1, BMPR1A, BRCA1, BRCA2, BRIP1, CDH1, CDK4, CDKN2A, CHEK2, CTNNA1, DICER1, EPCAM, GREM1, HOXB13, KIT, MEN1, MLH1, MSH2, MSH3, MSH6, MUTYH, NBN, NF1, NTHL1, PALB2, PDGFRA, PMS2, POLD1, POLE, PTEN, RAD50, RAD51C, RAD51D, SDHA, SDHB, SDHC, SDHD, SMAD4, SMARCA4, STK11, TP53,TSC1, TSC2, and VHL. Additional genes that are associated with adrenal tumors (as Lela's sister had) are: MAX, CDKN1B, RET, SDHAF2, [id number], EGLN1, FH, and KIF1B.       We discussed that many of these genes are associated with specific hereditary cancer syndromes and published management guidelines: Hereditary Breast and Ovarian Cancer syndrome (BRCA1, BRCA2), Manzo syndrome (MLH1, MSH2, MSH6, PMS2, EPCAM), Familial Adenomatous Polyposis (APC), Hereditary Diffuse Gastric Cancer (CDH1), Familial Atypical Multiple Mole Melanoma syndrome (CDK4, CDKN2A), Multiple Endocrine Neoplasia type 1 (MEN1), Multiple Endocrine Neoplasia type 2 (RET), Multiple Endocrine Neoplasia type 4 (CDKN1B), Juvenile Polyposis syndrome (BMPR1A, SMAD4), Cowden syndrome (PTEN), Li Fraumeni syndrome (TP53), Hereditary Paraganglioma and Pheochromocytoma syndrome (SDHA, SDHF2, SDHB, SDHC, SDHD, MAX, VCJM014), Peutz-Jeghers syndrome (STK11), MUTYH Associated Polyposis (MUTYH), Tuberous sclerosis complex (TSC1, TSC2), Von Hippel-Lindau disease (VHL), and Neurofibromatosis type 1 (NF1). The BRIAN, AXIN2, BRIP1, CHEK2, GREM1, MSH3, NBN, NTHL1, PALB2, POLD1, POLE, RAD51C, and RAD51D genes are associated with increased cancer risk and have published management guidelines for certain cancers. The remaining  genes are associated with increased cancer risk and may allow us to make medical recommendations when mutations are identified.       Medical Management: For Lela, we reviewed that the information from genetic testing may determine:    additional cancer screening for which Lela may qualify (e.g. mammogram and breast MRI, more frequent colonoscopies, more frequent dermatologic exams, etc.),    options for risk-reducing surgeries Lela could consider (e.g. bilateral mastectomy, surgery to remove her ovaries and/or uterus, etc.),      and surgeries or targeted chemotherapies for Lela, if she were to develop certain cancers in the future (e.g. lumpectomy vs mastectomy, immunotherapy for individuals with Manzo syndrome, PARP inhibitors for HBOC syndrome, etc.).       These recommendations and possible targeted therapies will be discussed in detail once genetic testing is completed.     Plan:  1) Today, Lela decided to proceed with a Common Hereditary Cancer genetic testing panel (with addition of genes associated with adrenal tumors).  Therefore, consent was reviewed and verbally for this testing.    2) We reviewed the options for sample collection.  Lela plans to have her blood drawn for genetic testing at her local [facility] lab site. Test results are expected to be available within 4-6 weeks of that blood draw.  3) Lela will either have a telephone visit or video visit to discuss the results.  Additional recommendations about screening will be made at that time.    Felicita Jenkins MS, [facility]  Genetic Counselor  Ph: [phone]    Face to face time: 74 minutes

## 2021-11-02 NOTE — LETTER
Cancer Risk Management  Program Locations    [facility]  [facility]  [facility]  [facility]  [facility]  Mailing Address  Cancer Risk Management Program  [address]  [place] [address]    New patient appointments  [phone]  November 20, 2021    Lela Beckwith  [address] [facility] [address]      Dear Lela,    It was a pleasure talking with you on 11-2-2021. Here is a copy of the progress note from your recent genetic counseling visit to the Cancer Risk Management Program. If you have any additional questions, please feel free to contact me.    Cancer Risk Management Program Genetic Counseling Note    11/2/2021    Referring Provider: Dr. Jeny Baron    Presenting Information:   I met with Lela Beckwith today for genetic counseling through the Cancer Risk Management Program, in order to discuss her family history of breast, colon, prostate, and other cancer.  She presents today to review this history, cancer screening recommendations, and available genetic testing options.  This consultation took place via a video visit due to COVID-19 restrictions (see attestation below).    Personal History:  Lela is a 61 year old female.  She does not have any personal history of invasive cancer; she does report having a Mohs procedure in 2018 for a basal cell skin cancer near her right eye.  In other medical history, Lela is reported to have hyperlipidemia and osteopenia.  It  is also reported in her chart that she has a history of joint pain and a past history of asthma.    With respect to her hormone-based medical history, Lela reports that she had her first menstrual period around age 13 and underwent menopause around age 53-55.  She reports that she took oral contraceptives for a time in her late teens and early 20s. She states that she has used  Premarin cream in the past. She reports that she had her first child at around age 30.   She has her ovaries, uterus, and fallopian tubes intact.    With respect to cancer screening, Lela had a mammogram in August of this year which was negative.  She reports no history of previous abnormal mammograms or breast biopsies.  Lela had a colonoscopy in , which was negative.  A follow-up colonoscopy in  noted 3 tubular adenomas; it was recommended that she have a follow-up colonoscopy in 3 years time.    Lela reports that she smoked cigarettes for about 10 years in the past.  She states that she has no personal history of chronic, excessive alcohol use.  Lela states that she is not aware of any other personal history of any specific, potentially concerning environmental exposures with respect to cancer risk.    Family History: (Please see scanned pedigree for detailed family history information)    Lela reports that she had a sister that was diagnosed with colon cancer at age 71 and, unfortunately,  at age 72 from complications of that cancer.  Lela reports that this sister was also noted to have an adrenal tumor, which was thought to be unrelated to her colon cancer but was not fully assessed before her death.      Lela reports that the only cancer she is aware of on her father's side of the family is a paternal first cousin with a head and neck cancer; she reports that this individual did have a history of smoking.    Lela reports that she has 3 maternal aunts with breast cancer: one that  from this disease at age 60, one that  of this disease at age 71, and another that  of this disease at age 73.    Lela reports that she has another maternal aunt that  of lung cancer at age 74; Lela reports that she is not aware of any history of smoking in that individual.    Lela reports that she had a paternal uncle that was diagnosed with both prostate cancer and multiple myeloma at an later age; she  reports that he  of complications of cancer at age 83.    Lela states that she has another maternal uncle that  of leukemia at age 61.    Lela reports that she has a maternal first-cousin that was diagnosed with breast cancer in her 60s.      Lela also reports a maternal first cousin who has a history of a brain tumor and who also had a daughter with a brain tumor.    Lela reports that her family is primarily of [place] ancestry.  There is no known Ashkenazi Shinto ancestry on either side of her family. There is no reported consanguinity.    Discussion:    We reviewed the features of sporadic, familial, and hereditary cancers. In looking at Lela's family history, it is possible that a cancer susceptibility gene is present due to her family history of multiple family members with breast cancer, affecting two generations of the family.  We talked about that some of the other cancers seen in Lela's family (e.g. prostate cancer, leukemia, colon cancer) can be related to breast cancer in some hereditary cancer syndromes.      We discussed the natural history and genetics of hereditary cancer syndromes associated with breast (and other) cancers. A detailed handout regarding some of these hereditary cancer syndromes and the information we discussed was provided to Lela at the end of our appointment today and can be found in the after visit summary. Topics included: inheritance pattern, cancer risks, cancer screening recommendations, and also risks, benefits and limitations of testing.      Based on her family history, Lela meets current National Comprehensive Cancer Network (NCCN) criteria for genetic testing of high-penetrance breast and/or ovarian cancer susceptibility genes (including BRCA1, BRCA2, CDH1, PALB2, PTEN, and TP53), because of her family history of 4 close relatives with breast cancer.        We discussed that there are additional genes that could cause increased risk for breast (and other) cancer. As  many of these genes present with overlapping features in a family and accurate cancer risk cannot always be established based upon the pedigree analysis alone, it would be reasonable for Lela to consider panel genetic testing to analyze multiple genes at once.      Lela stated that she was interested in pursuing genetic testing through a panel of genes associated with hereditary cancer syndromes, and so we reviewed the various panel options and their potential benefits and limitations.  After this conversation, Lela decided that she was interested in the Common Hereditary Cancer genetic testing panel (plus additional adrenal tumor genes).      The Common Hereditary Cancers genetic testing panel through [facility] includes analysis for 47 genes associated with cancers of the breast, ovary, uterus, prostate and gastrointestinal system: APC, BRIAN, AXIN2, BARD1, BMPR1A, BRCA1, BRCA2, BRIP1, CDH1, CDK4, CDKN2A, CHEK2, CTNNA1, DICER1, EPCAM, GREM1, HOXB13, KIT, MEN1, MLH1, MSH2, MSH3, MSH6, MUTYH, NBN, NF1, NTHL1, PALB2, PDGFRA, PMS2, POLD1, POLE, PTEN, RAD50, RAD51C, RAD51D, SDHA, SDHB, SDHC, SDHD, SMAD4, SMARCA4, STK11, TP53,TSC1, TSC2, and VHL. Additional genes that are associated with adrenal tumors (as Lela's sister had) are: MAX, CDKN1B, RET, SDHAF2, DTBN243, EGLN1, FH, and KIF1B.       We discussed that many of these genes are associated with specific hereditary cancer syndromes and published management guidelines: Hereditary Breast and Ovarian Cancer syndrome (BRCA1, BRCA2), Manzo syndrome (MLH1, MSH2, MSH6, PMS2, EPCAM), Familial Adenomatous Polyposis (APC), Hereditary Diffuse Gastric Cancer (CDH1), Familial Atypical Multiple Mole Melanoma syndrome (CDK4, CDKN2A), Multiple Endocrine Neoplasia type 1 (MEN1), Multiple Endocrine Neoplasia type 2 (RET), Multiple Endocrine Neoplasia type 4 (CDKN1B), Juvenile Polyposis syndrome (BMPR1A, SMAD4), Cowden syndrome (PTEN), Li Fraumeni syndrome (TP53), Hereditary Paraganglioma  and Pheochromocytoma syndrome (SDHA, SDHF2, SDHB, SDHC, SDHD, MAX, [id number]), Peutz-Jeghers syndrome (STK11), MUTYH Associated Polyposis (MUTYH), Tuberous sclerosis complex (TSC1, TSC2), Von Hippel-Lindau disease (VHL), and Neurofibromatosis type 1 (NF1). The BRIAN, AXIN2, BRIP1, CHEK2, GREM1, MSH3, NBN, NTHL1, PALB2, POLD1, POLE, RAD51C, and RAD51D genes are associated with increased cancer risk and have published management guidelines for certain cancers. The remaining genes are associated with increased cancer risk and may allow us to make medical recommendations when mutations are identified.       Medical Management: For Lela, we reviewed that the information from genetic testing may determine:    additional cancer screening for which Lela may qualify (e.g. mammogram and breast MRI, more frequent colonoscopies, more frequent dermatologic exams, etc.),    options for risk-reducing surgeries Lela could consider (e.g. bilateral mastectomy, surgery to remove her ovaries and/or uterus, etc.),      and surgeries or targeted chemotherapies for Lela, if she were to develop certain cancers in the future (e.g. lumpectomy vs mastectomy, immunotherapy for individuals with Mnazo syndrome, PARP inhibitors for HBOC syndrome, etc.).       These recommendations and possible targeted therapies will be discussed in detail once genetic testing is completed.     Plan:  1) Today, Lela decided to proceed with a Common Hereditary Cancer genetic testing panel (with addition of genes associated with adrenal tumors).  Therefore, consent was reviewed and verbally for this testing.    2) We reviewed the options for sample collection.  Lela plans to have her blood drawn for genetic testing at her local [facility] lab site. Test results are expected to be available within 4-6 weeks of that blood draw.  3) Lela will either have a telephone visit or video visit to discuss the results.  Additional recommendations about screening will  be made at that time.    Felicita Jenkins MS, [facility]  Genetic Counselor  Ph: [phone]    Face to face time: 74 minutes

## 2021-11-03 NOTE — PROGRESS NOTES
Cancer Risk Management Program Genetic Counseling Note    11/2/2021    Referring Provider: Dr. Jeny Baron    Presenting Information:   I met with Lela Beckwith today for genetic counseling through the Cancer Risk Management Program, in order to discuss her family history of breast, colon, prostate, and other cancer.  She presents today to review this history, cancer screening recommendations, and available genetic testing options.  This consultation took place via a video visit due to COVID-19 restrictions (see attestation below).    Personal History:  Lela is a 61 year old female.  She does not have any personal history of invasive cancer; she does report having a Mohs procedure in 2018 for a basal cell skin cancer near her right eye.  In other medical history, Lela is reported to have hyperlipidemia and osteopenia.  It  is also reported in her chart that she has a history of joint pain and a past history of asthma.    With respect to her hormone-based medical history, Lela reports that she had her first menstrual period around age 13 and underwent menopause around age 53-55.  She reports that she took oral contraceptives for a time in her late teens and early 20s. She states that she has used Premarin cream in the past. She reports that she had her first child at around age 30.   She has her ovaries, uterus, and fallopian tubes intact.    With respect to cancer screening, Lela had a mammogram in August of this year which was negative.  She reports no history of previous abnormal mammograms or breast biopsies.  Lela had a colonoscopy in 2015, which was negative.  A follow-up colonoscopy in 2020 noted 3 tubular adenomas; it was recommended that she have a follow-up colonoscopy in 3 years time.    Lela reports that she smoked cigarettes for about 10 years in the past.  She states that she has no personal history of chronic, excessive alcohol use.  Lela states that she is not aware of any other personal history of  any specific, potentially concerning environmental exposures with respect to cancer risk.    Family History: (Please see scanned pedigree for detailed family history information)    Lela reports that she had a sister that was diagnosed with colon cancer at age 71 and, unfortunately,  at age 72 from complications of that cancer.  Lela reports that this sister was also noted to have an adrenal tumor, which was thought to be unrelated to her colon cancer but was not fully assessed before her death.      Lela reports that the only cancer she is aware of on her father's side of the family is a paternal first cousin with a head and neck cancer; she reports that this individual did have a history of smoking.    Lela reports that she has 3 maternal aunts with breast cancer: one that  from this disease at age 60, one that  of this disease at age 71, and another that  of this disease at age 73.    Lela reports that she has another maternal aunt that  of lung cancer at age 74; Lela reports that she is not aware of any history of smoking in that individual.    Lela reports that she had a paternal uncle that was diagnosed with both prostate cancer and multiple myeloma at an later age; she reports that he  of complications of cancer at age 83.    Lela states that she has another maternal uncle that  of leukemia at age 61.    Lela reports that she has a maternal first-cousin that was diagnosed with breast cancer in her 60s.      Lela also reports a maternal first cousin who has a history of a brain tumor and who also had a daughter with a brain tumor.    Lela reports that her family is primarily of Lao ancestry.  There is no known Ashkenazi Yazdanism ancestry on either side of her family. There is no reported consanguinity.    Discussion:    We reviewed the features of sporadic, familial, and hereditary cancers. In looking at Lela's family history, it is possible that a cancer susceptibility gene is  present due to her family history of multiple family members with breast cancer, affecting two generations of the family.  We talked about that some of the other cancers seen in Lela's family (e.g. prostate cancer, leukemia, colon cancer) can be related to breast cancer in some hereditary cancer syndromes.      We discussed the natural history and genetics of hereditary cancer syndromes associated with breast (and other) cancers. A detailed handout regarding some of these hereditary cancer syndromes and the information we discussed was provided to Lela at the end of our appointment today and can be found in the after visit summary. Topics included: inheritance pattern, cancer risks, cancer screening recommendations, and also risks, benefits and limitations of testing.      Based on her family history, Lela meets current National Comprehensive Cancer Network (NCCN) criteria for genetic testing of high-penetrance breast and/or ovarian cancer susceptibility genes (including BRCA1, BRCA2, CDH1, PALB2, PTEN, and TP53), because of her family history of 4 close relatives with breast cancer.        We discussed that there are additional genes that could cause increased risk for breast (and other) cancer. As many of these genes present with overlapping features in a family and accurate cancer risk cannot always be established based upon the pedigree analysis alone, it would be reasonable for Lela to consider panel genetic testing to analyze multiple genes at once.      Lela stated that she was interested in pursuing genetic testing through a panel of genes associated with hereditary cancer syndromes, and so we reviewed the various panel options and their potential benefits and limitations.  After this conversation, Lela decided that she was interested in the Common Hereditary Cancer genetic testing panel (plus additional adrenal tumor genes).      The Common Hereditary Cancers genetic testing panel through [facility] includes  analysis for 47 genes associated with cancers of the breast, ovary, uterus, prostate and gastrointestinal system: APC, BRIAN, AXIN2, BARD1, BMPR1A, BRCA1, BRCA2, BRIP1, CDH1, CDK4, CDKN2A, CHEK2, CTNNA1, DICER1, EPCAM, GREM1, HOXB13, KIT, MEN1, MLH1, MSH2, MSH3, MSH6, MUTYH, NBN, NF1, NTHL1, PALB2, PDGFRA, PMS2, POLD1, POLE, PTEN, RAD50, RAD51C, RAD51D, SDHA, SDHB, SDHC, SDHD, SMAD4, SMARCA4, STK11, TP53,TSC1, TSC2, and VHL. Additional genes that are associated with adrenal tumors (as Lela's sister had) are: MAX, CDKN1B, RET, SDHAF2, [id number], EGLN1, FH, and KIF1B.       We discussed that many of these genes are associated with specific hereditary cancer syndromes and published management guidelines: Hereditary Breast and Ovarian Cancer syndrome (BRCA1, BRCA2), Manzo syndrome (MLH1, MSH2, MSH6, PMS2, EPCAM), Familial Adenomatous Polyposis (APC), Hereditary Diffuse Gastric Cancer (CDH1), Familial Atypical Multiple Mole Melanoma syndrome (CDK4, CDKN2A), Multiple Endocrine Neoplasia type 1 (MEN1), Multiple Endocrine Neoplasia type 2 (RET), Multiple Endocrine Neoplasia type 4 (CDKN1B), Juvenile Polyposis syndrome (BMPR1A, SMAD4), Cowden syndrome (PTEN), Li Fraumeni syndrome (TP53), Hereditary Paraganglioma and Pheochromocytoma syndrome (SDHA, SDHF2, SDHB, SDHC, SDHD, MAX, [id number]), Peutz-Jeghers syndrome (STK11), MUTYH Associated Polyposis (MUTYH), Tuberous sclerosis complex (TSC1, TSC2), Von Hippel-Lindau disease (VHL), and Neurofibromatosis type 1 (NF1). The BRIAN, AXIN2, BRIP1, CHEK2, GREM1, MSH3, NBN, NTHL1, PALB2, POLD1, POLE, RAD51C, and RAD51D genes are associated with increased cancer risk and have published management guidelines for certain cancers. The remaining genes are associated with increased cancer risk and may allow us to make medical recommendations when mutations are identified.       Medical Management: For Lela, we reviewed that the information from genetic testing may determine:    additional cancer  screening for which Lela may qualify (e.g. mammogram and breast MRI, more frequent colonoscopies, more frequent dermatologic exams, etc.),    options for risk-reducing surgeries Lela could consider (e.g. bilateral mastectomy, surgery to remove her ovaries and/or uterus, etc.),      and surgeries or targeted chemotherapies for Lela, if she were to develop certain cancers in the future (e.g. lumpectomy vs mastectomy, immunotherapy for individuals with Manzo syndrome, PARP inhibitors for HBOC syndrome, etc.).       These recommendations and possible targeted therapies will be discussed in detail once genetic testing is completed.     Plan:  1) Today, Lela decided to proceed with a Common Hereditary Cancer genetic testing panel (with addition of genes associated with adrenal tumors).  Therefore, consent was reviewed and verbally for this testing.    2) We reviewed the options for sample collection.  Lela plans to have her blood drawn for genetic testing at her local [facility] lab site. Test results are expected to be available within 4-6 weeks of that blood draw.  3) Lela will either have a telephone visit or video visit to discuss the results.  Additional recommendations about screening will be made at that time.    Felicita Jenkins MS, [facility]  Genetic Counselor  Ph: [phone]    Face to face time: 74 minutes    Lela is a 61 year old who is being evaluated via a billable video visit.      How would you like to obtain your AVS? mail    Video Start Time: 11:12AM     Video-Visit Details    Type of service:  Video Visit    Video End Time:12:26PM    Originating Location (pt. Location): Home    Distant Location (provider location):  [facility] CANCER [facility]     Platform used for Video Visit: SingleHop

## 2021-11-04 NOTE — PATIENT INSTRUCTIONS
Assessing Cancer Risk  Only about 5-10% of cancers are thought to be due to an inherited cancer susceptibility gene.    These families often have:    Several people with the same or related types of cancer    Cancers diagnosed at a young age (before age 50)    Individuals with more than one primary cancer    Multiple generations of the family affected with cancer    Some people may be candidates for genetic testing of more than one gene.  For these families, genetic testing using a cancer panel may be offered.  These panels will test different genes known to increase the risk for breast, ovarian, uterine, and/or other cancers. All of the genes discussed below have published clinical management guidelines for individuals who are found to carry a mutation. The purpose of this handout is to serve as a brief summary of the genes analyzed by the panels used to inquire about hereditary breast and gynecologic cancer:  BRIAN, BRCA1, BRCA2, BRIP1, CDH1, CHEK2, MLH1, MSH2, MSH6, PMS2, EPCAM, PTEN, PALB2, RAD51C, RAD51D, and TP53.  ______________________________________________________________________________  Hereditary Breast and Ovarian Cancer Syndrome   (BRCA1 and BRCA2)  A single mutation in one of the copies of BRCA1 or BRCA2 increases the risk for breast and ovarian cancer, among others.  The risk for pancreatic cancer and melanoma may also be slightly increased in some families.  The chart below shows the chance that someone with a BRCA mutation would develop cancer in his or her lifetime1,2,3,4.        A person s ethnic background is also important to consider, as individuals of Ashkenazi Taoism ancestry have a higher chance of having a BRCA gene mutation.  There are three BRCA mutations that occur more frequently in this population.    Manzo Syndrome   (MLH1, MSH2, MSH6, PMS2, and EPCAM)  Currently five genes are known to cause Manzo Syndrome: MLH1, MSH2, MSH6, PMS2, and EPCAM.  A single mutation in one of the  Manzo Syndrome genes increases the risk for colon, endometrial, ovarian, and stomach cancers.  Other cancers that occur less commonly in Manzo Syndrome include urinary tract, skin, and brain cancers.  The chart below shows the chance that a person with Manzo syndrome would develop cancer in his or her lifetime5.      *Cancer risk varies depending on Manzo syndrome gene found    Cowden Syndrome   (PTEN)  Cowden syndrome is a hereditary condition that increases the risk for breast, thyroid, endometrial, colon, and kidney cancer.  Cowden syndrome is caused by a mutation in the PTEN gene.  A single mutation in one of the copies of PTEN causes Cowden syndrome and increases cancer risk.  The chart below shows the chance that someone with a PTEN mutation would develop cancer in their lifetime6,7.  Other benign features seen in some individuals with Cowden syndrome include benign skin lesions (facial papules, keratoses, lipomas), learning disability, autism, thyroid nodules, colon polyps, and larger head size.      *One recent study found breast cancer risk to be increased to 85%    Li-Fraumeni Syndrome   (TP53)  Li-Fraumeni Syndrome (LFS) is a cancer predisposition syndrome caused by a mutation in the TP53 gene. A single mutation in one of the copies of TP53 increases the risk for multiple cancers. Individuals with LFS are at an increased risk for developing cancer at a young age. The lifetime risk for development of a LFS-associated cancer is 50% by age 30 and 90% by age 60.   Core Cancers: Sarcomas, Breast, Brain, Lung, Leukemias/Lymphomas, Adrenocortical carcinomas  Other Cancers: Gastrointestinal, Thyroid, Skin, Genitourinary    Hereditary Diffuse Gastric Cancer   (CDH1)  Currently, one gene is known to cause hereditary diffuse gastric cancer (HDGC): CDH1.  Individuals with HDGC are at increased risk for diffuse gastric cancer and lobular breast cancer. Of people diagnosed with HDGC, 30-50% have a mutation in the CDH1  gene.  This suggests there are likely other genes that may cause HDGC that have not been identified yet.      Lifetime Cancer Risks    General Population HDGC    Diffuse Gastric  <1% ~80%   Breast 12% 39-52%         Additional Genes  BRIAN  BRIAN is a moderate-risk breast cancer gene. Women who have a mutation in BRIAN can have between a 2-4 fold increased risk for breast cancer compared to the general population8. BRIAN mutations have also been associated with increased risk for pancreatic cancer, however an estimate of this cancer risk is not well understood9. Individuals who inherit two BRIAN mutations have a condition called ataxia-telangiectasia (AT).  This rare autosomal recessive condition affects the nervous system and immune system, and is associated with progressive cerebellar ataxia beginning in childhood.  Individuals with ataxia-telangiectasia often have a weakened immune system and have an increased risk for childhood cancers.    PALB2  Mutations in PALB2 have been shown to increase the risk of breast cancer up to 33-58% in some families; where individuals fall within this risk range is dependent upon family [email or web address]. PALB2 mutations have also been associated with increased risk for pancreatic cancer, although this risk has not been quantified yet.  Individuals who inherit two PALB2 mutations--one from their mother and one from their father--have a condition called Fanconi Anemia.  This rare autosomal recessive condition is associated with short stature, developmental delay, bone marrow failure, and increased risk for childhood cancers.    CHEK2   CHEK2 is a moderate-risk breast cancer gene.  Women who have a mutation in CHEK2 have around a 2-fold increased risk for breast cancer compared to the general population, and this risk may be higher depending upon family history.11,12,13 Mutations in CHEK2 have also been shown to increase the risk of a number of other cancers, including colon and prostate, however  these cancer risks are currently not well understood.    BRIP1, RAD51C and RAD51D  Mutations in BRIP1, RAD51C, and RAD51D have been shown to increase the risk of ovarian cancer and possibly female breast cancer as well14,15 .       Lifetime Cancer Risk    General Population BRIP1 RAD51C RAD51D   Ovarian 1-2% ~5-8% ~5-9% ~7-15%           Inheritance  All of the cancer syndromes reviewed above are inherited in an autosomal dominant pattern.  This means that if a parent has a mutation, each of his or her children will have a 50% chance of inheriting that same mutation.  Therefore, each child--male or female--would have a 50% chance of being at increased risk for developing cancer.      Image obtained from Genetics Home Reference, 2013     Mutations in some genes can occur de eladio, which means that a person s mutation occurred for the first time in them and was not inherited from a parent.  Now that they have the mutation, however, it can be passed on to future generations.    Genetic Testing  Genetic testing involves a blood test and will look at the genetic information in the BRIAN, BRCA1, BRCA2, BRIP1, CDH1, CHEK2, MLH1, MSH2, MSH6, PMS2, EPCAM, PTEN, PALB2, RAD51C, RAD51D, and TP53 genes for any harmful mutations that are associated with increased cancer risk.  If possible, it is recommended that the person(s) who has had cancer be tested before other family members.  That person will give us the most useful information about whether or not a specific gene is associated with the cancer in the family.    Results  There are three possible results of genetic testing:    Positive--a harmful mutation was identified in one or more of the genes    Negative--no mutation was identified in any of the genes on this panel    Variant of unknown significance--a variation in one of the genes was identified, but it is unclear how this impacts cancer risk in the family    Advantages and Disadvantages   There are advantages and  disadvantages to genetic testing.    Advantages    May clarify your cancer risk    Can help you make medical decisions    May explain the cancers in your family    May give useful information to your family members (if you share your results)    Disadvantages    Possible negative emotional impact of learning about inherited cancer risk    Uncertainty in interpreting a negative test result in some situations    Possible genetic discrimination concerns (see below)    Genetic Information Nondiscrimination Act (DEVORAH)  DEVORAH is a federal law that protects individuals from health insurance or employment discrimination based on a genetic test result alone.  Although rare, there are currently no legal discrimination protections in terms of life insurance, long term care, or disability insurances.  Visit the tamy[employer] Research [place] website to learn more.    Reducing Cancer Risk  All of the genes described above have nationally recognized cancer screening guidelines that would be recommended for individuals who test positive.  In addition to increased cancer screening, surgeries may be offered or recommended to reduce cancer risk.  Recommendations are based upon an individual s genetic test result as well as their personal and family history of cancer.    Questions to Think About Regarding Genetic Testing:    What effect will the test result have on me and my relationship with my family members if I have an inherited gene mutation?  If I don t have a gene mutation?    Should I share my test results, and how will my family react to this news, which may also affect them?    Are my children ready to learn new information that may one day affect their own health?    Hereditary Cancer Resources    FORCE: Facing Our Risk of Cancer Empowered facingourrisk.org   Bright Pink bebrightpink.org   Li-Fraumeni Syndrome Association lfsassociation.org   PTEN World PTENworld.com   No stomach for cancer, Inc.  nostomachforcancer.org   Stomach cancer relief network Scrnet.org   Collaborative Group of the Americas on Inherited Colorectal Cancer (CGA) cgaicc.com    Cancer Care cancercare.org   American Cancer Society (ACS) cancer.org   National Cancer [place] (NCI) cancer.gov     Please call us if you have any questions or concerns.   Cancer Risk Management Program [phone]-CANCER ([phone])  ? Rafael Brady, MS, [facility] [phone]  X    Felicita Jenkins, MS, [facility]  [phone]    [email or web address]  ? Kimberly Osbaldoeliazar, MS, [facility]  [phone]  ? Urvashi Kendrick, MS, [facility] [phone]  ? Rtish Mirta, MS, [facility] [phone]  ? Blanka Power, MS, [facility]  [phone]  ? Marybel Orta, MS  [phone]    References  1. Warren RODARTE, Donavan PDP, Saadia S, Edmund LI, José Miguel JE, Rafael JL, John N, Sandra H, Andrey O, Yulia A, Margaritoini B, Radibentley P, Mansalima S, Breonna DM, Bc N, Adrian E, Myesha H, Fly E, Alexsander J, Kayla J, Josephine B, Manuel H, Thorlacius S, Eerola H, Aziza H, Skylar K, Deanne OP. Average risks of breast and ovarian cancer associated with BRCA1 or BRCA2 mutations detected in case series unselected for family history: a combined analysis of 222 studies. Am J Hum Christina. 2003;72:1117-30.  2. Crow N, Sana M, Santacruz G.  BRCA1 and BRCA2 Hereditary Breast and Ovarian Cancer. Gene Reviews online. 2013.  3. Heriberto YC, Patria S, Dione G, Conley S. Breast cancer risk among male BRCA1 and BRCA2 mutation carriers. J Natl Cancer Inst. 2007;99:1811-4.  4. Yobani GARNER, Erwin I, Dereck J, Megan E, Aleyda ER, Shayne F. Risk of breast cancer in male BRCA2 carriers. J Med Christina. 2010;47:710-1.  5. National Comprehensive Cancer Network. Clinical practice guidelines in oncology, colorectal cancer screening. Available online (registration required). 2015.  6. Carlos CHAVEZ, Migel J, Chrissie J, Marcus OJEDA, Dougie PALACIOS, Carla C. Lifetime cancer risks in individuals with germline PTEN mutations. Clin Cancer Res.  2012;18:400-7.  7. Brady MONTIEL. Cowden Syndrome: A Critical Review of the Clinical Literature. J Christina . 2009:18:13-27.  8. Isidro RODARTE, Harrison D, Susie S, Carla P, Nathan T, Estefania M, Farzad B, Chanell H, Kirsty R, Lilliam K, Eleazar L, Yobani DG, Breonna D, Marko DF, Seamus MR, The Breast Cancer Susceptibility Collaboration (UK) & Ruperto CHAIREZ. BRIAN mutations that cause ataxia-telangiectasia are breast cancer susceptibility alleles. Nature Genetics. 2006;38:873-875  9. Alfonso N , Michaela Y, Melissa J, Mitchell L, Gal GM , Jasmeet ML, Gallinger S, Mccoy AG, Syngal S, Berny ML, Chas J , Mili R, Mya SZ, Daniela JR, Bertha VE, Shweta M, Vogelstein B, Naga N, Dia RH, Azael KW, and Doug AP. BRIAN mutations in patients with hereditary pancreatic cancer. Cancer Discover. 2012;2:41-46  10. Warren JAIMES, et al. Breast-Cancer Risk in Families with Mutations in PALB2. NEJM. 2014; 371(6):497-506.  11. CHEK2 Breast Cancer Case-Control Consortium. CHEK2*1100delC and susceptibility to breast cancer: A collaborative analysis involving 10,860 breast cancer cases and 9,065 controls from 10 studies. Am J Hum Christina, 74 (2004), pp. 1351-7279  12. Ivania T, Arnulfo S, Garrick K, et al. Spectrum of Mutations in BRCA1, BRCA2, CHEK2, and TP53 in Families at High Risk of Breast Cancer. ADARSH. 2006;295(12):0375-6354.   13. Thai SALDANA, Toyin BEAR, Yadi A, et al. Risk of breast cancer in women with a CHEK2 mutation with and without a family history of breast cancer. J Clin Oncol. 2011;29:4661-7575.  14. Get H, Peter E, Naresh SJ, et al. Contribution of germline mutations in the RAD51B, RAD51C, and RAD51D genes to ovarian cancer in the population. J Clin Oncol. 2015;33(26):0017-9101. Doi:10.1200/JCO.2015.61.2408.  15. James T, Michael POSADA, Justice P, et al. Mutations in BRIP1 confer high risk of ovarian cancer. Pascale Christina. 2011;43(11):7656-2211. doi:10.1038/ng.955.

## 2021-12-01 ENCOUNTER — IMMUNIZATION (OUTPATIENT)
Dept: FAMILY MEDICINE | Facility: CLINIC | Age: 61
End: 2021-12-01
Payer: COMMERCIAL

## 2021-12-01 ENCOUNTER — LAB (OUTPATIENT)
Dept: LAB | Facility: CLINIC | Age: 61
End: 2021-12-01
Payer: COMMERCIAL

## 2021-12-01 DIAGNOSIS — Z80.42 FAMILY HX OF PROSTATE CANCER: ICD-10-CM

## 2021-12-01 DIAGNOSIS — Z80.7 FAMILY HISTORY OF MULTIPLE MYELOMA: ICD-10-CM

## 2021-12-01 DIAGNOSIS — Z83.2 FAMILY HISTORY OF HEMATOLOGIC DISORDER: ICD-10-CM

## 2021-12-01 DIAGNOSIS — Z80.3 FAMILY HISTORY OF MALIGNANT NEOPLASM OF BREAST: ICD-10-CM

## 2021-12-01 DIAGNOSIS — Z80.0 FAMILY HISTORY OF COLON CANCER: ICD-10-CM

## 2021-12-01 DIAGNOSIS — Z84.89 FAMILY HISTORY OF NEOPLASM OF BRAIN: ICD-10-CM

## 2021-12-01 DIAGNOSIS — Z80.8: ICD-10-CM

## 2021-12-01 DIAGNOSIS — Z80.6 FAMILY HISTORY OF LEUKEMIA: ICD-10-CM

## 2021-12-01 PROCEDURE — 36415 COLL VENOUS BLD VENIPUNCTURE: CPT

## 2021-12-01 PROCEDURE — 99000 SPECIMEN HANDLING OFFICE-LAB: CPT

## 2021-12-01 PROCEDURE — 90682 RIV4 VACC RECOMBINANT DNA IM: CPT

## 2021-12-01 PROCEDURE — 90471 IMMUNIZATION ADMIN: CPT

## 2021-12-16 LAB
Lab: NORMAL
PERFORMING LABORATORY: NORMAL
SPECIMEN STATUS: NORMAL
TEST NAME: NORMAL

## 2021-12-21 ENCOUNTER — TELEPHONE (OUTPATIENT)
Dept: ONCOLOGY | Facility: CLINIC | Age: 61
End: 2021-12-21
Payer: COMMERCIAL

## 2021-12-21 NOTE — TELEPHONE ENCOUNTER
12/21/21 - [facility] to call [phone] opt5, opt2 to schedule virtual return with Valarie Jenkins ANYTIME for genetic testing resutls. -Gaurav  *JACEY HAS APPROVED TO ADD ON 12:45 PM SLOT TO ANY OF HER TEMPLATED DAYS*

## 2021-12-23 ENCOUNTER — VIRTUAL VISIT (OUTPATIENT)
Dept: ONCOLOGY | Facility: CLINIC | Age: 61
End: 2021-12-23
Payer: COMMERCIAL

## 2021-12-23 DIAGNOSIS — Z80.7 FAMILY HISTORY OF MULTIPLE MYELOMA: ICD-10-CM

## 2021-12-23 DIAGNOSIS — Z80.6 FAMILY HISTORY OF LEUKEMIA: ICD-10-CM

## 2021-12-23 DIAGNOSIS — Z80.42 FAMILY HX OF PROSTATE CANCER: ICD-10-CM

## 2021-12-23 DIAGNOSIS — Z80.0 FAMILY HISTORY OF COLON CANCER: ICD-10-CM

## 2021-12-23 DIAGNOSIS — Z80.3 FAMILY HISTORY OF MALIGNANT NEOPLASM OF BREAST: Primary | ICD-10-CM

## 2021-12-23 DIAGNOSIS — Z84.89 FAMILY HISTORY OF NEOPLASM OF BRAIN: ICD-10-CM

## 2021-12-23 DIAGNOSIS — Z80.8: ICD-10-CM

## 2021-12-23 DIAGNOSIS — Z83.2 FAMILY HISTORY OF HEMATOLOGIC DISORDER: ICD-10-CM

## 2021-12-23 PROCEDURE — 999N000069 HC STATISTIC GENETIC COUNSELING, < 16 MIN: Mod: GT,95 | Performed by: GENETIC COUNSELOR, MS

## 2021-12-23 NOTE — LETTER
Cancer Risk Management  Program [facility]  [facility]  [facility] Cancer [facility]  [facility]  Mailing Address  Cancer Risk Management Program  [address]  [place] [address]    New patient appointments  [phone]  January 22, 2022    Lela Beckwith  [address] [address]      Dear Lela,    It was a pleasure talking with you again in Decemeber. Here is a copy of the progress note from your recent genetic counseling visit to the Cancer Risk Management Program; I apologize for the inadvertent delay in getting this letter to you. If you have any additional questions, please feel free to contact me.    Cancer Risk Management Program Genetic Counseling Note    12/23/2022    Referring Provider: Dr. Jeny Baron    Presenting Information:  Lela Beckwith had a return video visit to the Cancer Risk Management Program, in order to discuss her genetic testing results. Her blood was drawn on 12-1-2021.  A custom genetic testing panel was ordered from [facility] (Common Hereditary Cancers genetic testing panel plus genes known to be associated with adrenal tumors). This testing was done because of her family history of multiple types of cancer.    Genetic Testing Result: NEGATIVE  Lela is negative for mutations in the following genes:  APC, BRIAN, AXIN2, BARD1, BMPR1A, BRCA1, BRCA2, BRIP1, CDH1, CDK4, CDKN1B, CDKN2A, CHEK2, CTNNA1, DICER1, EGLN1, EPCAM, FH, GREM1, HOXB13, KIF1B, KIT, MAX, MEN1, MLH1, MSH2, MSH3, MSH6, MUTYH, NBN, NF1, NTHL1, PALB2, PDGFRA, PMS2, POLD1, POLE, PTEN, RAD50, RAD51C, RAD51D, RET, SDHA, SDHAF2, SDHB, SDHC, SDHD,  SMAD4, SMARCA4, STK11, VQUB061, TP53, TSC1, TSC2, and VHL.  (See the test report for details of the assay.)    Therefore, genetic testing did not detect an identifiable mutation associated with  "Hereditary Breast and Ovarian Cancer syndrome (BRCA1, BRCA2), Manzo syndrome (MLH1, MSH2, MSH6, PMS2, EPCAM), Familial Adenomatous Polyposis (APC), Hereditary Diffuse Gastric Cancer (CDH1), Familial Atypical Multiple Mole Melanoma syndrome (CDK4, CDKN2A), Juvenile Polyposis syndrome (BMPR1A, SMAD4), Cowden syndrome (PTEN), Li Fraumeni syndrome (TP53), Peutz-Jeghers syndrome (STK11), MUTYH Associated Polyposis (MUTYH), Hereditary Paraganglioma and Pheochromocytoma syndrome (SDHA, SDHAF2, SDHB, SDHC, SDHD, [id number], MAX), Tuberous sclerosis complex (TSC1, TSC2), Von Hippel-Lindau disease (VHL), Neurofibromatosis type 1 (NF1), Multiple Endocrine Neoplasia type 1 (MEN1) and Multiple Endocrine Neoplasia type II (RET).    A copy of the test report can be found in the Laboratory tab, dated 12-1-2021, and named \"LABORATORY MISCELLANEOUS ORDER.\" The report is scanned in as a linked document.    Interpretation:  We discussed several different interpretations of this negative test result:    1. One explanation may be that there is a different gene or combination of genes and environment that are associated with the cancers in this family.  2. Another explanation may be that some of her relatives did have a mutation in one of these hereditary cancer genes, but she did not inherit it.  3. Another possible explanation are that the cancers in her family are sporadic and not related to a particular hereditary genetic risk factor.  4. There is also a small possibility that there is a mutation in one of these genes, and the testing laboratory could not find it with their current testing methods.       Screening:  Based on this negative test result, it is important for Lela and her relatives to refer back to the family history for appropriate cancer screening:      We talked about today that since we do not have a genetic explanation for the breast cancers in Lela's family, this negative genetic test result does not give us specific " breast cancer risk information for Lela personally.  In this circumstance, we talked about that a few different breast cancer risk models have been used to try to estimate a woman's breast cancer risk, in order to determine which women should consider increased breast cancer surveillance:      - We talked about that the Esteban model is based on family history of breast cancer in first and second degree relatives only; we talked about that this model would allow me to estimate breast cancer risk based on two of her aunts' history of breast cancer.  Lela reports that one of her maternal aunts was diagnosed with breast cancer in her 60s and another in her 70s; using this information, the Esteban tables gives an estimated 11% lifetime risk of breast cancer. (We talked about that this risk estimate does not allow me to include her other relatives with breast cancer.)       - We talked about that another model (the ERIC breast cancer risk calculator) gives breast cancer risk estimates based on family history, plus some additional risk factors such as breast density, previous breast biopsies, age of menarche/first child, and previous genetic testing results; the ERIC risk calculator predicted around a 10% lifetime risk for breast cancer for Lela.    - We talked about that when a women's estimated lifetime risk of breast cancer is 20% or higher based on at least one of these models, it is generally recommended that they have additional conversations about the options for increased breast cancer surveillance.  We talked about that Lela's estimated breast cancer risks do not meet this 20% threshold, and so general population breast screening is recommended for her (based on the current information we have about her medical and family history).      We talked about that current colon cancer screening recommendations state that individuals with a first degree relative with colon cancer (ie. sibling, parent, or child) should  undergo colonoscopy screening at a minimum of 5 year intervals.  This screening recommendation would apply to all of Lela's siblings, as well as to the son of Lela's sister who had colon cancer.  Lela reports that she is currently undergoing colonoscopy screening at a 3 year interval, because of a history of previous colon polyps, and she is encouraged to follow-up on that recommendation.      Other population cancer screening options, such as those recommended by the American Cancer Society and the National Comprehensive Cancer Network (NCCN), are also appropriate for Lela and her family. These screening recommendations may change if there are changes to Lela's personal and/or family history of cancer. Final screening recommendations should be made by each individual's primary care provider.    Inheritance:  We reviewed the inheritance of mutations in hereditary cancer genes. We discussed that, for the genes that Lela tested negative for, we would expect that Lela did not pass on an identifiable mutation in these genes to her children based on this test result. Mutations in these genes do not skip generations.      Additional Testing Considerations:  It is possible Lela does carry a gene or combination of genes and environment that increase her risk for cancer that was not identifiable through this particular genetic testing panel. Lela is encouraged to contact me in the future if she wants to know if there is additional genetic testing that might be available/indicated for her then or if she wishes to readdress larger gene panel options in the future.     Although Lela's genetic testing result was negative, other relatives may still carry a gene mutation associated with an increased risk for cancer. Genetic counseling is recommended for consideration for Lela's siblings and maternal realatives to discuss genetic testing options. If any of these relatives do pursue genetic testing, Lela is encouraged to contact me  so that we may review the impact of their test results on her.    Summary:  We do not have an explanation for Lela's family history of cancer. While no genetic changes were identified, Lela may still be at risk for certain cancers due to family history, environmental factors, or other genetic causes not identified by this test.  Because of that, it is important that she continue with cancer screening based on her personal and family history as discussed above.    Genetic testing is rapidly advancing, and new cancer susceptibility genes will most likely be identified in the future. Therefore, I encouraged Lela to contact me annually or if there are changes in her personal or family history. This may change how we assess her cancer risk, screening, and the testing we would offer.    Plan:  1.  I will release to Lela a copy of her test results through the [employer] portal, along with a handout summarizing negative genetic test results (see after visit summary).  She will also receive a copy of this clinic note.  2. Lela plans to follow-up with her primary care provider and gastroenterology provider as previously recommended, with respect to cancer screening.  3. Lela should contact me regularly, or sooner if her family history changes, and she would like to know if there are additional screening or testing recommendations at that time.     If Lela has any further questions, I encouraged her to contact me at [phone] or via [employer] or through email: [email or web address].     Felicita Jenkins MS, [facility]  Genetic Counselor    Time spent face to face over video: 14 minutes

## 2021-12-23 NOTE — LETTER
Cancer Risk Management  Program Locations    [facility]  [facility]  Mailing Address  Cancer Risk Management Program  [address] [address]    New patient appointments  [phone]  January 22, 2022    Dr. Baron,    Thank you for the referral of your patient.  Below is the progress note from our recent visit about her genetic testing results.    Cancer Risk Management Program Genetic Counseling Note    12/23/2022    Referring Provider: Dr. Jeny Baron    Presenting Information:  Lela Beckwith had a return video visit to the Cancer Risk Management Program, in order to discuss her genetic testing results. Her blood was drawn on 12-1-2021.  A custom genetic testing panel was ordered from [facility] (Common Hereditary Cancers genetic testing panel plus genes known to be associated with adrenal tumors). This testing was done because of her family history of multiple types of cancer.    Genetic Testing Result: NEGATIVE  Lela is negative for mutations in the following genes:  APC, BRIAN, AXIN2, BARD1, BMPR1A, BRCA1, BRCA2, BRIP1, CDH1, CDK4, CDKN1B, CDKN2A, CHEK2, CTNNA1, DICER1, EGLN1, EPCAM, FH, GREM1, HOXB13, KIF1B, KIT, MAX, MEN1, MLH1, MSH2, MSH3, MSH6, MUTYH, NBN, NF1, NTHL1, PALB2, PDGFRA, PMS2, POLD1, POLE, PTEN, RAD50, RAD51C, RAD51D, RET, SDHA, SDHAF2, SDHB, SDHC, SDHD,  SMAD4, SMARCA4, STK11, CRAY211, TP53, TSC1, TSC2, and VHL.  (See the test report for details of the assay.)    Therefore, genetic testing did not detect an identifiable mutation associated with Hereditary Breast and Ovarian Cancer syndrome (BRCA1, BRCA2), Manzo syndrome (MLH1, MSH2, MSH6, PMS2, EPCAM), Familial Adenomatous Polyposis (APC), Hereditary Diffuse Gastric Cancer (CDH1), Familial Atypical Multiple Mole Melanoma syndrome  "(CDK4, CDKN2A), Juvenile Polyposis syndrome (BMPR1A, SMAD4), Cowden syndrome (PTEN), Li Fraumeni syndrome (TP53), Peutz-Jeghers syndrome (STK11), MUTYH Associated Polyposis (MUTYH), Hereditary Paraganglioma and Pheochromocytoma syndrome (SDHA, SDHAF2, SDHB, SDHC, SDHD, [id number], MAX), Tuberous sclerosis complex (TSC1, TSC2), Von Hippel-Lindau disease (VHL), Neurofibromatosis type 1 (NF1), Multiple Endocrine Neoplasia type 1 (MEN1) and Multiple Endocrine Neoplasia type II (RET).    A copy of the test report can be found in the Laboratory tab, dated 12-1-2021, and named \"LABORATORY MISCELLANEOUS ORDER.\" The report is scanned in as a linked document.    Interpretation:  We discussed several different interpretations of this negative test result:    1. One explanation may be that there is a different gene or combination of genes and environment that are associated with the cancers in this family.  2. Another explanation may be that some of her relatives did have a mutation in one of these hereditary cancer genes, but she did not inherit it.  3. Another possible explanation are that the cancers in her family are sporadic and not related to a particular hereditary genetic risk factor.  4. There is also a small possibility that there is a mutation in one of these genes, and the testing laboratory could not find it with their current testing methods.       Screening:  Based on this negative test result, it is important for Lela and her relatives to refer back to the family history for appropriate cancer screening:      We talked about today that since we do not have a genetic explanation for the breast cancers in Lela's family, this negative genetic test result does not give us specific breast cancer risk information for Lela personally.  In this circumstance, we talked about that a few different breast cancer risk models have been used to try to estimate a woman's breast cancer risk, in order to determine which women should " consider increased breast cancer surveillance:      - We talked about that the Esteban model is based on family history of breast cancer in first and second degree relatives only; we talked about that this model would allow me to estimate breast cancer risk based on two of her aunts' history of breast cancer.  Lela reports that one of her maternal aunts was diagnosed with breast cancer in her 60s and another in her 70s; using this information, the Esteban tables gives an estimated 11% lifetime risk of breast cancer. (We talked about that this risk estimate does not allow me to include her other relatives with breast cancer.)       - We talked about that another model (the ERIC breast cancer risk calculator) gives breast cancer risk estimates based on family history, plus some additional risk factors such as breast density, previous breast biopsies, age of menarche/first child, and previous genetic testing results; the ERIC risk calculator predicted around a 10% lifetime risk for breast cancer for Lela.    - We talked about that when a women's estimated lifetime risk of breast cancer is 20% or higher based on at least one of these models, it is generally recommended that they have additional conversations about the options for increased breast cancer surveillance.  We talked about that Lela's estimated breast cancer risks do not meet this 20% threshold, and so general population breast screening is recommended for her (based on the current information we have about her medical and family history).      We talked about that current colon cancer screening recommendations state that individuals with a first degree relative with colon cancer (ie. sibling, parent, or child) should undergo colonoscopy screening at a minimum of 5 year intervals.  This screening recommendation would apply to all of Lela's siblings, as well as to the son of Lela's sister who had colon cancer.  Lela reports that she is currently undergoing  colonoscopy screening at a 3 year interval, because of a history of previous colon polyps, and she is encouraged to follow-up on that recommendation.      Other population cancer screening options, such as those recommended by the American Cancer Society and the National Comprehensive Cancer Network (NCCN), are also appropriate for Lela and her family. These screening recommendations may change if there are changes to Lela's personal and/or family history of cancer. Final screening recommendations should be made by each individual's primary care provider.    Inheritance:  We reviewed the inheritance of mutations in hereditary cancer genes. We discussed that, for the genes that Lela tested negative for, we would expect that Lela did not pass on an identifiable mutation in these genes to her children based on this test result. Mutations in these genes do not skip generations.      Additional Testing Considerations:  It is possible Lela does carry a gene or combination of genes and environment that increase her risk for cancer that was not identifiable through this particular genetic testing panel. Lela is encouraged to contact me in the future if she wants to know if there is additional genetic testing that might be available/indicated for her then or if she wishes to readdress larger gene panel options in the future.     Although Lela's genetic testing result was negative, other relatives may still carry a gene mutation associated with an increased risk for cancer. Genetic counseling is recommended for consideration for Lela's siblings and maternal realatives to discuss genetic testing options. If any of these relatives do pursue genetic testing, Lela is encouraged to contact me so that we may review the impact of their test results on her.    Summary:  We do not have an explanation for Lela's family history of cancer. While no genetic changes were identified, Lela may still be at risk for certain cancers due to  family history, environmental factors, or other genetic causes not identified by this test.  Because of that, it is important that she continue with cancer screening based on her personal and family history as discussed above.    Genetic testing is rapidly advancing, and new cancer susceptibility genes will most likely be identified in the future. Therefore, I encouraged Lela to contact me annually or if there are changes in her personal or family history. This may change how we assess her cancer risk, screening, and the testing we would offer.    Plan:  1.  I will release to Lela a copy of her test results through the [employer] portal, along with a handout summarizing negative genetic test results (see after visit summary).  She will also receive a copy of this clinic note.  2. Lela plans to follow-up with her primary care provider and gastroenterology provider as previously recommended, with respect to cancer screening.  3. Lela should contact me regularly, or sooner if her family history changes, and she would like to know if there are additional screening or testing recommendations at that time.     If Lela has any further questions, I encouraged her to contact me at [phone] or via [employer] or through email: [email or web address].     Felicita Jenkins MS, [facility]  Genetic Counselor    Time spent face to face over video: 14 minutes

## 2022-01-23 NOTE — PATIENT INSTRUCTIONS
Negative Genetic Test Result    Genetic Testing  You had a blood test that looked at the genetic information in one or more genes associated with increased cancer risk.  The testing looked for any harmful changes that would stop this particular gene from working like it should. If an individual does not have any harmful changes or variants of unknown significance found from their blood test, their genetic test result is reported as negative.       Results  The genetic test did not identify any pathogenic (harmful) changes in the genes that were tested. There are several possible explanations for a negative test result. Without knowing the gene mutation in your family, the cause of the cancer in your relatives is still unknown. Your genetic counselor can help interpret the result for you and your relatives. In this case, there are several reasons that may explain the negative test result:    There may be a gene mutation in the family that you did not inherit.     You may have a gene mutation in a different gene that was not included in the test, or has not yet been discovered.     The cancers in your family may be due to a combination of genetic factors and environment (multifactorial/familial).    The cancers in your family may be sporadic/random cancers.    There is very small chance that a mutation was not found by current testing methods.  As testing technology evolves over time, it may still be possible to identify a mutation in a gene that was not found on this test.    It is important to note which genes were included in your test. A list of these genes can be found on your test result.    Screening Recommendations  Due to this negative test result, cancer screening recommendations should be based on your personal and family history. This may include increased cancer screening for you and/or your family members. Your genetic counselor and health care provider can help make appropriate recommendations.       Please call us if you have any questions or concerns.   Cancer Risk Management Program [phone] ([phone])  ? Rafael Brady, MS, [facility] [phone]  X    Felicita Jenkins, MS, [facility]  [phone]      [email or web address]  ? Kimberly Bob, MS, [facility]  [phone]  ? Urvashi Kendrick, MS, [facility] [phone]  ? Trish Kirby, MS, [facility] [phone]  ? Blanka Steve, MS, [facility]  [phone]  ? Marybel Orta, MS  [phone]

## 2022-01-23 NOTE — PROGRESS NOTES
Cancer Risk Management Program Genetic Counseling Note    12/23/2022    Referring Provider: Dr. Jeny Baron    Presenting Information:  Lela Beckwith had a return video visit to the Cancer Risk Management Program, in order to discuss her genetic testing results. Her blood was drawn on 12-1-2021.  A custom genetic testing panel was ordered from [facility] (Common Hereditary Cancers genetic testing panel plus genes known to be associated with adrenal tumors). This testing was done because of her family history of multiple types of cancer.    Genetic Testing Result: NEGATIVE  Lela is negative for mutations in the following genes:  APC, BRIAN, AXIN2, BARD1, BMPR1A, BRCA1, BRCA2, BRIP1, CDH1, CDK4, CDKN1B, CDKN2A, CHEK2, CTNNA1, DICER1, EGLN1, EPCAM, FH, GREM1, HOXB13, KIF1B, KIT, MAX, MEN1, MLH1, MSH2, MSH3, MSH6, MUTYH, NBN, NF1, NTHL1, PALB2, PDGFRA, PMS2, POLD1, POLE, PTEN, RAD50, RAD51C, RAD51D, RET, SDHA, SDHAF2, SDHB, SDHC, SDHD,  SMAD4, SMARCA4, STK11, [id number], TP53, TSC1, TSC2, and VHL.  (See the test report for details of the assay.)    Therefore, genetic testing did not detect an identifiable mutation associated with Hereditary Breast and Ovarian Cancer syndrome (BRCA1, BRCA2), Manzo syndrome (MLH1, MSH2, MSH6, PMS2, EPCAM), Familial Adenomatous Polyposis (APC), Hereditary Diffuse Gastric Cancer (CDH1), Familial Atypical Multiple Mole Melanoma syndrome (CDK4, CDKN2A), Juvenile Polyposis syndrome (BMPR1A, SMAD4), Cowden syndrome (PTEN), Li Fraumeni syndrome (TP53), Peutz-Jeghers syndrome (STK11), MUTYH Associated Polyposis (MUTYH), Hereditary Paraganglioma and Pheochromocytoma syndrome (SDHA, SDHAF2, SDHB, SDHC, SDHD, [id number], MAX), Tuberous sclerosis complex (TSC1, TSC2), Von Hippel-Lindau disease (VHL), Neurofibromatosis type 1 (NF1), Multiple Endocrine Neoplasia type 1 (MEN1) and Multiple Endocrine Neoplasia type II (RET).    A copy of the test report can be found in the Laboratory tab, dated 12-1-2021, and  "named \"LABORATORY MISCELLANEOUS ORDER.\" The report is scanned in as a linked document.    Interpretation:  We discussed several different interpretations of this negative test result:    1. One explanation may be that there is a different gene or combination of genes and environment that are associated with the cancers in this family.  2. Another explanation may be that some of her relatives did have a mutation in one of these hereditary cancer genes, but she did not inherit it.  3. Another possible explanation are that the cancers in her family are sporadic and not related to a particular hereditary genetic risk factor.  4. There is also a small possibility that there is a mutation in one of these genes, and the testing laboratory could not find it with their current testing methods.       Screening:  Based on this negative test result, it is important for Lela and her relatives to refer back to the family history for appropriate cancer screening:      We talked about today that since we do not have a genetic explanation for the breast cancers in Lela's family, this negative genetic test result does not give us specific breast cancer risk information for Lela personally.  In this circumstance, we talked about that a few different breast cancer risk models have been used to try to estimate a woman's breast cancer risk, in order to determine which women should consider increased breast cancer surveillance:      - We talked about that the Esteban model is based on family history of breast cancer in first and second degree relatives only; we talked about that this model would allow me to estimate breast cancer risk based on two of her aunts' history of breast cancer.  Lela reports that one of her maternal aunts was diagnosed with breast cancer in her 60s and another in her 70s; using this information, the Esteban tables gives an estimated 11% lifetime risk of breast cancer. (We talked about that this risk estimate does not " allow me to include her other relatives with breast cancer.)       - We talked about that another model (the ERIC breast cancer risk calculator) gives breast cancer risk estimates based on family history, plus some additional risk factors such as breast density, previous breast biopsies, age of menarche/first child, and previous genetic testing results; the ERIC risk calculator predicted around a 10% lifetime risk for breast cancer for Lela.    - We talked about that when a women's estimated lifetime risk of breast cancer is 20% or higher based on at least one of these models, it is generally recommended that they have additional conversations about the options for increased breast cancer surveillance.  We talked about that Lela's estimated breast cancer risks do not meet this 20% threshold, and so general population breast screening is recommended for her (based on the current information we have about her medical and family history).      We talked about that current colon cancer screening recommendations state that individuals with a first degree relative with colon cancer (ie. sibling, parent, or child) should undergo colonoscopy screening at a minimum of 5 year intervals.  This screening recommendation would apply to all of Lela's siblings, as well as to the son of Lela's sister who had colon cancer.  Lela reports that she is currently undergoing colonoscopy screening at a 3 year interval, because of a history of previous colon polyps, and she is encouraged to follow-up on that recommendation.      Other population cancer screening options, such as those recommended by the American Cancer Society and the National Comprehensive Cancer Network (NCCN), are also appropriate for Lela and her family. These screening recommendations may change if there are changes to Lela's personal and/or family history of cancer. Final screening recommendations should be made by each individual's primary care  provider.    Inheritance:  We reviewed the inheritance of mutations in hereditary cancer genes. We discussed that, for the genes that Lela tested negative for, we would expect that Lela did not pass on an identifiable mutation in these genes to her children based on this test result. Mutations in these genes do not skip generations.      Additional Testing Considerations:  It is possible Lela does carry a gene or combination of genes and environment that increase her risk for cancer that was not identifiable through this particular genetic testing panel. Lela is encouraged to contact me in the future if she wants to know if there is additional genetic testing that might be available/indicated for her then or if she wishes to readdress larger gene panel options in the future.     Although Lela's genetic testing result was negative, other relatives may still carry a gene mutation associated with an increased risk for cancer. Genetic counseling is recommended for consideration for Lela's siblings and maternal realatives to discuss genetic testing options. If any of these relatives do pursue genetic testing, Lela is encouraged to contact me so that we may review the impact of their test results on her.    Summary:  We do not have an explanation for Lela's family history of cancer. While no genetic changes were identified, Lela may still be at risk for certain cancers due to family history, environmental factors, or other genetic causes not identified by this test.  Because of that, it is important that she continue with cancer screening based on her personal and family history as discussed above.    Genetic testing is rapidly advancing, and new cancer susceptibility genes will most likely be identified in the future. Therefore, I encouraged Lela to contact me annually or if there are changes in her personal or family history. This may change how we assess her cancer risk, screening, and the testing we would  offer.    Plan:  1.  I will release to Lela a copy of her test results through the [facility] portal, along with a handout summarizing negative genetic test results (see after visit summary).  She will also receive a copy of this clinic note.  2. Lela plans to follow-up with her primary care provider and gastroenterology provider as previously recommended, with respect to cancer screening.  3. Lela should contact me regularly, or sooner if her family history changes, and she would like to know if there are additional screening or testing recommendations at that time.     If Lela has any further questions, I encouraged her to contact me at [phone] or via [employer]Scale or through email: [email or web address].     Felicita Jenkins MS, [facility]  Genetic Counselor    Time spent face to face over video: 14 minutes

## 2022-05-19 ENCOUNTER — IMMUNIZATION (OUTPATIENT)
Dept: NURSING | Facility: CLINIC | Age: 62
End: 2022-05-19
Payer: COMMERCIAL

## 2022-05-19 PROCEDURE — 0064A COVID-19,PF,MODERNA (18+ YRS BOOSTER .25ML): CPT

## 2022-05-19 PROCEDURE — 91306 COVID-19,PF,MODERNA (18+ YRS BOOSTER .25ML): CPT

## 2022-08-31 ENCOUNTER — HOSPITAL ENCOUNTER (OUTPATIENT)
Dept: MAMMOGRAPHY | Facility: CLINIC | Age: 62
Discharge: HOME | End: 2022-08-31
Attending: FAMILY MEDICINE | Admitting: FAMILY MEDICINE
Payer: COMMERCIAL

## 2022-08-31 DIAGNOSIS — Z12.31 VISIT FOR SCREENING MAMMOGRAM: ICD-10-CM

## 2022-08-31 PROCEDURE — 77067 SCR MAMMO BI INCL CAD: CPT

## 2022-08-31 ASSESSMENT — ENCOUNTER SYMPTOMS
HEARTBURN: 0
PARESTHESIAS: 1
JOINT SWELLING: 0
ARTHRALGIAS: 1
NAUSEA: 0
NERVOUS/ANXIOUS: 1
FREQUENCY: 0
PALPITATIONS: 1
COUGH: 0
FEVER: 0
MYALGIAS: 1
DIARRHEA: 0
BREAST MASS: 0
HEADACHES: 0
CHILLS: 0
CONSTIPATION: 0
EYE PAIN: 0
WEAKNESS: 0
HEMATOCHEZIA: 0
SHORTNESS OF BREATH: 0
DYSURIA: 0
SORE THROAT: 0
HEMATURIA: 0
ABDOMINAL PAIN: 0
DIZZINESS: 0

## 2022-09-07 ENCOUNTER — OFFICE VISIT (OUTPATIENT)
Dept: FAMILY MEDICINE | Facility: CLINIC | Age: 62
End: 2022-09-07
Payer: COMMERCIAL

## 2022-09-07 VITALS
BODY MASS INDEX: 22.79 KG/M2 | OXYGEN SATURATION: 97 % | HEIGHT: 61 IN | HEART RATE: 70 BPM | WEIGHT: 120.7 LBS | DIASTOLIC BLOOD PRESSURE: 62 MMHG | SYSTOLIC BLOOD PRESSURE: 110 MMHG

## 2022-09-07 DIAGNOSIS — E78.5 HYPERLIPIDEMIA, UNSPECIFIED HYPERLIPIDEMIA TYPE: ICD-10-CM

## 2022-09-07 DIAGNOSIS — Z78.0 POSTMENOPAUSAL STATUS: ICD-10-CM

## 2022-09-07 DIAGNOSIS — Z00.00 ROUTINE GENERAL MEDICAL EXAMINATION AT A HEALTH CARE FACILITY: Primary | ICD-10-CM

## 2022-09-07 DIAGNOSIS — J45.20 MILD INTERMITTENT ASTHMA WITHOUT COMPLICATION: ICD-10-CM

## 2022-09-07 DIAGNOSIS — Z78.0 ASYMPTOMATIC POSTMENOPAUSAL STATUS: ICD-10-CM

## 2022-09-07 DIAGNOSIS — T75.3XXA MOTION SICKNESS, INITIAL ENCOUNTER: ICD-10-CM

## 2022-09-07 DIAGNOSIS — Z86.19 HISTORY OF COLD SORES: ICD-10-CM

## 2022-09-07 DIAGNOSIS — M85.80 OSTEOPENIA, UNSPECIFIED LOCATION: ICD-10-CM

## 2022-09-07 PROBLEM — H43.391 VITREOUS SYNERESIS OF RIGHT EYE: Status: ACTIVE | Noted: 2021-03-01

## 2022-09-07 PROBLEM — H40.1131 PRIMARY OPEN ANGLE GLAUCOMA (POAG) OF BOTH EYES, MILD STAGE: Status: ACTIVE | Noted: 2020-02-01

## 2022-09-07 PROBLEM — H40.89 OTHER SPECIFIED GLAUCOMA: Status: RESOLVED | Noted: 2020-07-09 | Resolved: 2022-09-07

## 2022-09-07 PROBLEM — H02.883 MEIBOMIAN GLAND DYSFUNCTION (MGD) OF BOTH EYES: Status: ACTIVE | Noted: 2021-03-01

## 2022-09-07 PROBLEM — H02.886 MEIBOMIAN GLAND DYSFUNCTION (MGD) OF BOTH EYES: Status: ACTIVE | Noted: 2021-03-01

## 2022-09-07 PROBLEM — H26.9 CATARACT, UNSPECIFIED CATARACT TYPE, UNSPECIFIED LATERALITY: Status: RESOLVED | Noted: 2020-08-27 | Resolved: 2022-09-07

## 2022-09-07 LAB
CHOLEST SERPL-MCNC: 196 MG/DL
DEPRECATED CALCIDIOL+CALCIFEROL SERPL-MC: 68 UG/L (ref 20–75)
HDLC SERPL-MCNC: 72 MG/DL
LDLC SERPL CALC-MCNC: 111 MG/DL
NONHDLC SERPL-MCNC: 124 MG/DL
TRIGL SERPL-MCNC: 65 MG/DL

## 2022-09-07 PROCEDURE — 80061 LIPID PANEL: CPT | Performed by: FAMILY MEDICINE

## 2022-09-07 PROCEDURE — 90471 IMMUNIZATION ADMIN: CPT | Performed by: FAMILY MEDICINE

## 2022-09-07 PROCEDURE — 99396 PREV VISIT EST AGE 40-64: CPT | Mod: 25 | Performed by: FAMILY MEDICINE

## 2022-09-07 PROCEDURE — 82306 VITAMIN D 25 HYDROXY: CPT | Performed by: FAMILY MEDICINE

## 2022-09-07 PROCEDURE — 36415 COLL VENOUS BLD VENIPUNCTURE: CPT | Performed by: FAMILY MEDICINE

## 2022-09-07 PROCEDURE — 90682 RIV4 VACC RECOMBINANT DNA IM: CPT | Performed by: FAMILY MEDICINE

## 2022-09-07 RX ORDER — SCOLOPAMINE TRANSDERMAL SYSTEM 1 MG/1
1 PATCH, EXTENDED RELEASE TRANSDERMAL
Qty: 3 PATCH | Refills: 0 | Status: SHIPPED | OUTPATIENT
Start: 2022-09-07 | End: 2023-05-02

## 2022-09-07 RX ORDER — ONDANSETRON 4 MG/1
4 TABLET, FILM COATED ORAL EVERY 8 HOURS PRN
Qty: 5 TABLET | Refills: 0 | Status: SHIPPED | OUTPATIENT
Start: 2022-09-07 | End: 2023-05-02

## 2022-09-07 RX ORDER — CHLORAL HYDRATE 500 MG
CAPSULE ORAL
COMMUNITY
Start: 2022-03-01

## 2022-09-07 RX ORDER — VIT C/E/ZN/COPPR/LUTEIN/ZEAXAN 60 MG-6 MG
CAPSULE ORAL
COMMUNITY
Start: 2022-03-01

## 2022-09-07 ASSESSMENT — ASTHMA QUESTIONNAIRES
ACT_TOTALSCORE: 24
QUESTION_3 LAST FOUR WEEKS HOW OFTEN DID YOUR ASTHMA SYMPTOMS (WHEEZING, COUGHING, SHORTNESS OF BREATH, CHEST TIGHTNESS OR PAIN) WAKE YOU UP AT NIGHT OR EARLIER THAN USUAL IN THE MORNING: NOT AT ALL
ACT_TOTALSCORE: 24
QUESTION_5 LAST FOUR WEEKS HOW WOULD YOU RATE YOUR ASTHMA CONTROL: COMPLETELY CONTROLLED
QUESTION_4 LAST FOUR WEEKS HOW OFTEN HAVE YOU USED YOUR RESCUE INHALER OR NEBULIZER MEDICATION (SUCH AS ALBUTEROL): NOT AT ALL
QUESTION_2 LAST FOUR WEEKS HOW OFTEN HAVE YOU HAD SHORTNESS OF BREATH: ONCE OR TWICE A WEEK
QUESTION_1 LAST FOUR WEEKS HOW MUCH OF THE TIME DID YOUR ASTHMA KEEP YOU FROM GETTING AS MUCH DONE AT WORK, SCHOOL OR AT HOME: NONE OF THE TIME

## 2022-09-07 ASSESSMENT — ENCOUNTER SYMPTOMS
NERVOUS/ANXIOUS: 1
FREQUENCY: 0
DIZZINESS: 0
JOINT SWELLING: 0
PARESTHESIAS: 1
EYE PAIN: 0
CHILLS: 0
MYALGIAS: 1
HEMATURIA: 0
HEADACHES: 0
ABDOMINAL PAIN: 0
DIARRHEA: 0
SORE THROAT: 0
HEMATOCHEZIA: 0
PALPITATIONS: 1
WEAKNESS: 0
BREAST MASS: 0
FEVER: 0
SHORTNESS OF BREATH: 0
DYSURIA: 0
HEARTBURN: 0
COUGH: 0
CONSTIPATION: 0
NAUSEA: 0
ARTHRALGIAS: 1

## 2022-09-07 NOTE — ASSESSMENT & PLAN NOTE
The patient is due for a DEXA scan which was ordered today.  Reviewed calcium and vitamin D recommendations and a vitamin D level was checked today.

## 2022-09-07 NOTE — PROGRESS NOTES
SUBJECTIVE:   CC: Lela Beckwith is an 62 year old woman who presents for preventive health visit.     HPI: Lela is a pleasant 62-year-old female presenting today for an annual physical exam and medication check visit.  The patient's only question relates to motion sickness.  She is going to be traveling to [place] leaving tomorrow and anticipates some issues especially with the ferry boat ride.      Patient has been advised of split billing requirements and indicates understanding: Yes  Healthy Habits:     Getting at least 3 servings of Calcium per day:  Yes    Bi-annual eye exam:  Yes    Dental care twice a year:  Yes    Sleep apnea or symptoms of sleep apnea:  None    Diet:  Regular (no restrictions)    Frequency of exercise:  4-5 days/week    Duration of exercise:  45-60 minutes    Taking medications regularly:  Yes    PHQ-2 Total Score: 1    Additional concerns today:  No        Today's PHQ-2 Score:   PHQ-2 ( 1999 Pfizer) 9/7/2022   Q1: Little interest or pleasure in doing things 0   Q2: Feeling down, depressed or hopeless 1   PHQ-2 Score 1   PHQ-2 Total Score (12-17 Years)- Positive if 3 or more points; Administer PHQ-A if positive -   Q1: Little interest or pleasure in doing things Not at all   Q2: Feeling down, depressed or hopeless Several days   PHQ-2 Score 1       Abuse: Current or Past (Physical, Sexual or Emotional) - No  Do you feel safe in your environment? Yes        Social History     Tobacco Use     Smoking status: Former Smoker     Smokeless tobacco: Never Used   Substance Use Topics     Alcohol use: Not on file         Alcohol Use 8/31/2022   Prescreen: >3 drinks/day or >7 drinks/week? No   Prescreen: >3 drinks/day or >7 drinks/week? -       Reviewed orders with patient.  Reviewed health maintenance and updated orders accordingly - Yes  Patient Active Problem List   Diagnosis     Leiomyoma Of The Uterus     Hyperlipidemia     Mild intermittent asthma     Osteopenia     Atrophic  vaginitis     Primary osteoarthritis of hands, bilateral     Family history of colon cancer     Bochdalek hernia     Meibomian gland dysfunction (MGD) of both eyes     Primary open angle glaucoma (POAG) of both eyes, mild stage     Vitreous syneresis of right eye     History of cold sores     Past Surgical History:   Procedure Laterality Date     BUNIONECTOMY      bilateral feet      RELEASE CARPAL TUNNEL      x 2     RHINOPLASTY       ZZC  DELIVERY ONLY      Description:  Section;  Recorded: 2009;  Comments: X 2       Social History     Tobacco Use     Smoking status: Former Smoker     Smokeless tobacco: Never Used   Substance Use Topics     Alcohol use: Not on file     Family History   Problem Relation Age of Onset     Breast Cancer Maternal Aunt      Cancer Maternal Uncle      Cancer Maternal Uncle      Colon Cancer Sister 69.00        BRAF mutation     Breast Cancer Maternal Aunt      Breast Cancer Maternal Aunt      Breast Cancer Maternal Aunt          Current Outpatient Medications   Medication Sig Dispense Refill     albuterol (PROAIR HFA;PROVENTIL HFA;VENTOLIN HFA) 90 mcg/actuation inhaler [ALBUTEROL (PROAIR HFA;PROVENTIL HFA;VENTOLIN HFA) 90 MCG/ACTUATION INHALER] Inhale 2 puffs every 6 (six) hours as needed for wheezing. 1 each 0     atorvastatin (LIPITOR) 10 MG tablet Take 1 tablet (10 mg) by mouth daily 90 tablet 3     cholecalciferol (VITAMIN D3) 25 mcg (1000 units) capsule        conjugated estrogens (PREMARIN) 0.625 MG/GM vaginal cream Insert 0.5 grams twice weekly into vagina 42.5 g 3     fish oil-omega-3 fatty acids 1000 MG capsule        melatonin 5 MG CAPS        multivitamin  with lutein (OCUVITE WITH LUTEIN) CAPS per capsule        ondansetron (ZOFRAN) 4 MG tablet Take 1 tablet (4 mg) by mouth every 8 hours as needed for nausea 5 tablet 0     scopolamine (TRANSDERM) 1 MG/3DAYS 72 hr patch Place 1 patch onto the skin every 72 hours 3 patch 0     UNABLE TO FIND [UNABLE TO  FIND] Med Name: Face treatment for pre cancer cells on face       valACYclovir (VALTREX) 1000 MG tablet [VALACYCLOVIR (VALTREX) 1000 MG TABLET] TK 2 TS PO Q 12 H FOR 1 DAY AT ONSET OF SYMPTOMS       No Known Allergies    Breast Cancer Screening:    FHS-7:   Breast CA Risk Assessment (FHS-7) 8/16/2021 8/31/2022   Did any of your first-degree relatives have breast or ovarian cancer? No No   Did any of your relatives have bilateral breast cancer? No No   Did any man in your family have breast cancer? No No   Did any woman in your family have breast and ovarian cancer? No No   Did any woman in your family have breast cancer before age 50 y? Yes Yes   Do you have 2 or more relatives with breast and/or ovarian cancer? Yes No   Do you have 2 or more relatives with breast and/or bowel cancer? Yes No       Mammogram Screening - Alternate mammogram schedule due to family history of breast cancer  Pertinent mammograms are reviewed under the imaging tab.    History of abnormal Pap smear: NO - age 30-65 PAP every 5 years with negative HPV co-testing recommended  PAP / HPV Latest Ref Rng & Units 2/25/2019 11/11/2014   PAP Negative for squamous intraepithelial lesion or malignancy. Negative for squamous intraepithelial lesion or malignancy  Electronically signed by Tarsha Jean CT (ASCP) on 3/1/2019 at  8:43 AM   Negative for squamous intraepithelial lesion or malignancy  Electronically signed by Allison Padron CT (ASCP) on 11/20/2014 at  3:48 PM     HPV16 NEG Negative -   HPV18 NEG Negative -   HRHPV NEG Negative -     Reviewed and updated as needed this visit by clinical staff   Tobacco  Allergies  Meds                Reviewed and updated as needed this visit by Provider    Review of Systems   Constitutional: Negative for chills and fever.   HENT: Positive for hearing loss. Negative for congestion, ear pain and sore throat.    Eyes: Negative for pain and visual disturbance.   Respiratory: Negative for cough and  "shortness of breath.    Cardiovascular: Positive for palpitations. Negative for chest pain and peripheral edema.   Gastrointestinal: Negative for abdominal pain, constipation, diarrhea, heartburn, hematochezia and nausea.   Breasts:  Negative for tenderness, breast mass and discharge.   Genitourinary: Negative for dysuria, frequency, genital sores, hematuria, pelvic pain, urgency, vaginal bleeding and vaginal discharge.   Musculoskeletal: Positive for arthralgias and myalgias. Negative for joint swelling.   Skin: Negative for rash.   Neurological: Positive for paresthesias. Negative for dizziness, weakness and headaches.   Psychiatric/Behavioral: Negative for mood changes. The patient is nervous/anxious.            OBJECTIVE:   /62 (BP Location: Left arm, Patient Position: Left side, Cuff Size: Adult Regular)   Pulse 70   Ht 1.543 m (5' 0.75\")   Wt 54.7 kg (120 lb 11.2 oz)   SpO2 97%   BMI 22.99 kg/m    Physical Exam  GENERAL APPEARANCE: healthy, alert and no distress  EYES: Eyes grossly normal to inspection, PERRL and conjunctivae and sclerae normal  HENT: ear canals and TM's normal, nose and mouth without ulcers or lesions, oropharynx clear and oral mucous membranes moist  NECK: no adenopathy, no asymmetry, masses, or scars and thyroid normal to palpation  RESP: lungs clear to auscultation - no rales, rhonchi or wheezes  BREAST: normal without masses, tenderness or nipple discharge and no palpable axillary masses or adenopathy  CV: regular rate and rhythm, normal S1 S2, no S3 or S4, no murmur, click or rub, no peripheral edema and peripheral pulses strong  ABDOMEN: soft, nontender, no hepatosplenomegaly, no masses and bowel sounds normal  MS: no musculoskeletal defects are noted and gait is age appropriate without ataxia  SKIN: no suspicious lesions or rashes  NEURO: Normal strength and tone, sensory exam grossly normal, mentation intact and speech normal  PSYCH: mentation appears normal and affect " "normal/bright    Labs reviewed in Epic    ASSESSMENT/PLAN:     Problem List Items Addressed This Visit        Respiratory    Mild intermittent asthma     Stable and well-controlled.  Uses albuterol sparingly as needed.              Endocrine    Hyperlipidemia     Continues on Lipitor 10 mg daily.  Lipid panel checked today and the patient did start on omega-3 supplement due to dry eye issues.           Relevant Orders    Lipid Profile (Chol, Trig, HDL, LDL calc)       Musculoskeletal and Integumentary    Osteopenia     The patient is due for a DEXA scan which was ordered today.  Reviewed calcium and vitamin D recommendations and a vitamin D level was checked today.           Relevant Medications    cholecalciferol (VITAMIN D3) 25 mcg (1000 units) capsule    Other Relevant Orders    DX Hip/Pelvis/Spine    Vitamin D Deficiency       Other    History of cold sores      Other Visit Diagnoses     Routine general medical examination at a health care facility    -  Primary    Motion sickness, initial encounter        Relevant Medications    scopolamine (TRANSDERM) 1 MG/3DAYS 72 hr patch    ondansetron (ZOFRAN) 4 MG tablet    Asymptomatic postmenopausal status        Relevant Medications    conjugated estrogens (PREMARIN) 0.625 MG/GM vaginal cream    Postmenopausal status        Relevant Orders    DX Hip/Pelvis/Spine              COUNSELING:  Reviewed preventive health counseling, as reflected in patient instructions       Regular exercise       Healthy diet/nutrition       Osteoporosis prevention/bone health       Colorectal Cancer Screening    Estimated body mass index is 22.99 kg/m  as calculated from the following:    Height as of this encounter: 1.543 m (5' 0.75\").    Weight as of this encounter: 54.7 kg (120 lb 11.2 oz).        She reports that she has quit smoking. She has never used smokeless tobacco.      Counseling Resources:  ATP IV Guidelines  Pooled Cohorts Equation Calculator  Breast Cancer Risk " Calculator  BRCA-Related Cancer Risk Assessment: FHS-7 Tool  FRAX Risk Assessment  ICSI Preventive Guidelines  Dietary Guidelines for Americans, 2010  USDA's MyPlate  ASA Prophylaxis  Lung CA Screening    KATIE ZUÑIGA  [facility]

## 2022-09-07 NOTE — ASSESSMENT & PLAN NOTE
Continues on Lipitor 10 mg daily.  Lipid panel checked today and the patient did start on omega-3 supplement due to dry eye issues.

## 2022-09-26 ENCOUNTER — ANCILLARY PROCEDURE (OUTPATIENT)
Dept: BONE DENSITY | Facility: CLINIC | Age: 62
End: 2022-09-26
Attending: FAMILY MEDICINE
Payer: COMMERCIAL

## 2022-09-26 DIAGNOSIS — M85.80 OSTEOPENIA, UNSPECIFIED LOCATION: ICD-10-CM

## 2022-09-26 DIAGNOSIS — Z78.0 POSTMENOPAUSAL STATUS: ICD-10-CM

## 2022-09-26 PROCEDURE — 77080 DXA BONE DENSITY AXIAL: CPT | Mod: TC | Performed by: RADIOLOGY

## 2022-10-06 ENCOUNTER — MYC MEDICAL ADVICE (OUTPATIENT)
Dept: FAMILY MEDICINE | Facility: CLINIC | Age: 62
End: 2022-10-06

## 2022-10-06 DIAGNOSIS — Z78.0 ASYMPTOMATIC POSTMENOPAUSAL STATUS: ICD-10-CM

## 2022-10-07 ENCOUNTER — MYC MEDICAL ADVICE (OUTPATIENT)
Dept: FAMILY MEDICINE | Facility: CLINIC | Age: 62
End: 2022-10-07

## 2022-10-07 NOTE — TELEPHONE ENCOUNTER
"Called patient to ask about fax number to send rx, and she had just sent a separate message with that info which is pasted below.    \"thank you for doing this-   [facility]  Toll Free Fax: [phone]  please reference customer # [phone]  Thanks again!\"    Rx prepped.     "

## 2022-10-18 ENCOUNTER — TELEPHONE (OUTPATIENT)
Dept: FAMILY MEDICINE | Facility: CLINIC | Age: 62
End: 2022-10-18

## 2022-10-18 ENCOUNTER — MYC MEDICAL ADVICE (OUTPATIENT)
Dept: FAMILY MEDICINE | Facility: CLINIC | Age: 62
End: 2022-10-18

## 2022-10-18 DIAGNOSIS — Z78.0 ASYMPTOMATIC POSTMENOPAUSAL STATUS: ICD-10-CM

## 2022-10-18 NOTE — TELEPHONE ENCOUNTER
Sig had 2 sets of directions. Have edited to below statement from patient.  rx has been send to print so it can be signed and faxed.  Please review and sign if agreed

## 2022-10-18 NOTE — TELEPHONE ENCOUNTER
"  General Call      Reason for Call:  Prescription Clarification    What are your questions or concerns:    Pharmacy told her that the script for \"conjugated estrogens (PREMARIN) 0.625 MG/GM vaginal cream\" is missing a signature and need clarifications on the directions for use\"    Date of last appointment with provider: 09/07/22    Could we send this information to you in [employer] or would you prefer to receive a phone call?:   No preference   Okay to leave a detailed message?: Yes at Cell number on file:    Telephone Information:   Mobile [phone]     "

## 2022-10-22 ENCOUNTER — NURSE TRIAGE (OUTPATIENT)
Dept: NURSING | Facility: CLINIC | Age: 62
End: 2022-10-22

## 2022-10-22 NOTE — TELEPHONE ENCOUNTER
April from [facility] asking for clarification of premarin cream directions. Advised newest prescription is for 1 gram twice per week.    Caller verbalizes understanding.      Amandeep Rolle RN/[facility]

## 2022-10-22 NOTE — TELEPHONE ENCOUNTER
Reason for Disposition    Pharmacy calling with prescription question and triager answers question    Protocols used: MEDICATION QUESTION CALL-A-AH

## 2022-11-30 DIAGNOSIS — E78.5 HYPERLIPIDEMIA, UNSPECIFIED HYPERLIPIDEMIA TYPE: ICD-10-CM

## 2022-12-01 RX ORDER — ATORVASTATIN CALCIUM 10 MG/1
TABLET, FILM COATED ORAL
Qty: 90 TABLET | Refills: 2 | Status: SHIPPED | OUTPATIENT
Start: 2022-12-01 | End: 2023-08-28

## 2022-12-01 NOTE — TELEPHONE ENCOUNTER
"Last Written Prescription Date:  9/1/21  Last Fill Quantity: 90,  # refills: 3   Last office visit provider:  9/7/22     Requested Prescriptions   Pending Prescriptions Disp Refills     atorvastatin (LIPITOR) 10 MG tablet [Pharmacy Med Name: Atorvastatin Calcium Oral Tablet 10 MG] 90 tablet 0     Sig: TAKE ONE TABLET BY MOUTH ONE TIME DAILY       Statins Protocol Passed - 11/30/2022 11:05 PM        Passed - LDL on file in past 12 months     Recent Labs   Lab Test 09/07/22  0751   *             Passed - No abnormal creatine kinase in past 12 months     No lab results found.             Passed - Recent (12 mo) or future (30 days) visit within the authorizing provider's specialty     Patient has had an office visit with the authorizing provider or a provider within the authorizing providers department within the previous 12 mos or has a future within next 30 days. See \"Patient Info\" tab in inbasket, or \"Choose Columns\" in Meds & Orders section of the refill encounter.              Passed - Medication is active on med list        Passed - Patient is age 18 or older        Passed - No active pregnancy on record        Passed - No positive pregnancy test in past 12 months             Theresa Neal, RN 12/01/22 1:19 PM  "

## 2023-01-10 ENCOUNTER — TELEPHONE (OUTPATIENT)
Dept: FAMILY MEDICINE | Facility: CLINIC | Age: 63
End: 2023-01-10

## 2023-01-10 NOTE — TELEPHONE ENCOUNTER
FYI - Status Update    Who is Calling: patient    Update: Patient is having elevate b/p as well as testing with significant exertion.  She is wondering if she should see pcp for these concerns or if pcp suggests seeing a cardiologist.  Patient states if pcp suggests cardiology she would then need a referral.    Patient will not be available until after 4pm today (01/10/23) to take any calls, will be available all day tomorrow (01/11/23)    Does caller want a call/response back: Yes     Could we send this information to you in [facility] or would you prefer to receive a phone call?:   Patient would prefer a phone call     Okay to leave a detailed message?: Yes at Cell number on file:    Telephone Information:   Mobile [phone]

## 2023-01-11 NOTE — TELEPHONE ENCOUNTER
At home B/P Readings  *Left arm- mid day*    01/10- 134/88  01/09- 136/92  01/08- 124/88  01/07- 124/90

## 2023-01-16 NOTE — TELEPHONE ENCOUNTER
"Called patient to schedule visits per nurse message in forwarding comment in quotes below.    \"Please call patient and assist with scheduling a few appts-   Patient needs nurse only appointment today before 3:30pm for BP check.  Patient needs to be scheduled with [facility] next week (OK to double book against preop or use same day) for follow-up re exertional chest pain  \"      Spoke to patient, she states that she got her blood pressure checked at another office and that it looks fine. She said that she wants to hold off on following-up with provider she doesn't think she needs to see her for this anymore.  "

## 2023-01-18 ENCOUNTER — MYC MEDICAL ADVICE (OUTPATIENT)
Dept: FAMILY MEDICINE | Facility: CLINIC | Age: 63
End: 2023-01-18
Payer: COMMERCIAL

## 2023-01-18 DIAGNOSIS — Z12.11 SCREEN FOR COLON CANCER: Primary | ICD-10-CM

## 2023-01-23 ENCOUNTER — TRANSFERRED RECORDS (OUTPATIENT)
Dept: HEALTH INFORMATION MANAGEMENT | Facility: CLINIC | Age: 63
End: 2023-01-23

## 2023-04-21 ENCOUNTER — OFFICE VISIT (OUTPATIENT)
Dept: OTOLARYNGOLOGY | Facility: CLINIC | Age: 63
End: 2023-04-21
Payer: COMMERCIAL

## 2023-04-21 ENCOUNTER — OFFICE VISIT (OUTPATIENT)
Dept: AUDIOLOGY | Facility: CLINIC | Age: 63
End: 2023-04-21
Payer: COMMERCIAL

## 2023-04-21 DIAGNOSIS — H90.5 SNHL (SENSORINEURAL HEARING LOSS): Primary | ICD-10-CM

## 2023-04-21 DIAGNOSIS — M26.623 BILATERAL TEMPOROMANDIBULAR JOINT PAIN: Primary | ICD-10-CM

## 2023-04-21 DIAGNOSIS — H93.11 TINNITUS OF RIGHT EAR: ICD-10-CM

## 2023-04-21 PROCEDURE — 92550 TYMPANOMETRY & REFLEX THRESH: CPT | Performed by: AUDIOLOGIST

## 2023-04-21 PROCEDURE — 99203 OFFICE O/P NEW LOW 30 MIN: CPT | Performed by: OTOLARYNGOLOGY

## 2023-04-21 PROCEDURE — 92557 COMPREHENSIVE HEARING TEST: CPT | Performed by: AUDIOLOGIST

## 2023-04-21 NOTE — LETTER
4/21/2023         RE: Lela Beckwith  [address] [facility] [address]        Dear Colleague,    Thank you for referring your patient, Lela Beckwith, to the [facility]. Please see a copy of my visit note below.    HPI: This patient is a 63yo F who presents to the clinic for evaluation of her ears. She has been hearing popping in her R>L ear for the past 6 months. There is suspected clenching and over the past 6 months, she has been dealing with the failing health of a family member. Denies otorrhea, hearing loss, tinnitus, vertigo, and other major symptoms such as fever, weight loss, odynophagia, dysphagia, and hemoptysis.     Past medical history, surgical history, social history, family history, medications, and allergies have been reviewed with the patient and are documented above.    Review of Systems: a 10-system review was performed. Pertinent positives are noted in the HPI and on a separate scanned document in the chart.    PHYSICAL EXAMINATION:  GEN: no acute distress, normocephalic  EYES: extraocular movements are intact, pupils are equal and round. Sclera clear.   EARS: auricles are normally formed. The external auditory canals are clear with minimal to no cerumen. Tympanic membranes are intact bilaterally with no signs of infection, effusion, retractions, or perforations.  NOSE: anterior nares are patent.   OC/OP: clear, dentition is in good repair but with flattening and wear facets. The tongue and palate are fully mobile and symmetric. The floor of mouth, base of tongue, and tonsils are soft and symmetric.  NECK: soft and supple. No lymphadenopathy or masses. Airway is midline. Tenderness of the bilateral TMJ.  NEURO: CN VII and XII symmetric. alert and oriented. No spontaneous nystagmus. Gait is normal.  PULM: breathing comfortably on room air, normal chest expansion with respiration  CARDS: no cyanosis or clubbing. Normal carotid pulses.    AUDIOGRAM: normal hearing.  Slight negative pressure tymps, but technically Type A.    MEDICAL DECISION-MAKING: This patient is a 63yo F with ear discomfort most likely from TMD given the timeline that matches up with increased life stress and likely clenching. Discussed soft diet, NSAIDS, and a bite guard. Referred to the TMJ clinic.        Again, thank you for allowing me to participate in the care of your patient.        Sincerely,        Elana Willson MD

## 2023-04-21 NOTE — PROGRESS NOTES
AUDIOLOGY REPORT    SUMMARY: Audiology visit completed. See audiogram for results.      RECOMMENDATIONS: Follow-up with ENT.    Mason Aragon, CCC-A  [place] Licensed Audiologist #[id number]

## 2023-04-21 NOTE — PROGRESS NOTES
HPI: This patient is a 63yo F who presents to the clinic for evaluation of her ears. She has been hearing popping in her R>L ear for the past 6 months. There is suspected clenching and over the past 6 months, she has been dealing with the failing health of a family member. Denies otorrhea, hearing loss, tinnitus, vertigo, and other major symptoms such as fever, weight loss, odynophagia, dysphagia, and hemoptysis.     Past medical history, surgical history, social history, family history, medications, and allergies have been reviewed with the patient and are documented above.    Review of Systems: a 10-system review was performed. Pertinent positives are noted in the HPI and on a separate scanned document in the chart.    PHYSICAL EXAMINATION:  GEN: no acute distress, normocephalic  EYES: extraocular movements are intact, pupils are equal and round. Sclera clear.   EARS: auricles are normally formed. The external auditory canals are clear with minimal to no cerumen. Tympanic membranes are intact bilaterally with no signs of infection, effusion, retractions, or perforations.  NOSE: anterior nares are patent.   OC/OP: clear, dentition is in good repair but with flattening and wear facets. The tongue and palate are fully mobile and symmetric. The floor of mouth, base of tongue, and tonsils are soft and symmetric.  NECK: soft and supple. No lymphadenopathy or masses. Airway is midline. Tenderness of the bilateral TMJ.  NEURO: CN VII and XII symmetric. alert and oriented. No spontaneous nystagmus. Gait is normal.  PULM: breathing comfortably on room air, normal chest expansion with respiration  CARDS: no cyanosis or clubbing. Normal carotid pulses.    AUDIOGRAM: normal hearing. Slight negative pressure tymps, but technically Type A.    MEDICAL DECISION-MAKING: This patient is a 63yo F with ear discomfort most likely from TMD given the timeline that matches up with increased life stress and likely clenching. Discussed soft  diet, NSAIDS, and a bite guard. Referred to the TMJ clinic.

## 2023-05-01 ENCOUNTER — TRANSFERRED RECORDS (OUTPATIENT)
Dept: HEALTH INFORMATION MANAGEMENT | Facility: CLINIC | Age: 63
End: 2023-05-01
Payer: COMMERCIAL

## 2023-08-01 ENCOUNTER — PATIENT OUTREACH (OUTPATIENT)
Dept: CARE COORDINATION | Facility: CLINIC | Age: 63
End: 2023-08-01
Payer: MEDICARE

## 2023-08-03 ENCOUNTER — ANCILLARY ORDERS (OUTPATIENT)
Dept: FAMILY MEDICINE | Facility: CLINIC | Age: 63
End: 2023-08-03

## 2023-08-03 DIAGNOSIS — Z12.31 VISIT FOR SCREENING MAMMOGRAM: Primary | ICD-10-CM

## 2023-08-28 DIAGNOSIS — E78.5 HYPERLIPIDEMIA, UNSPECIFIED HYPERLIPIDEMIA TYPE: ICD-10-CM

## 2023-08-28 RX ORDER — ATORVASTATIN CALCIUM 10 MG/1
TABLET, FILM COATED ORAL
Qty: 90 TABLET | Refills: 0 | Status: SHIPPED | OUTPATIENT
Start: 2023-08-28 | End: 2023-10-18

## 2023-08-28 NOTE — TELEPHONE ENCOUNTER
"Last Written Prescription Date:  12/1/2022  Last Fill Quantity: 90,  # refills: 2   Last office visit provider:  9/7/2022     Requested Prescriptions   Pending Prescriptions Disp Refills    atorvastatin (LIPITOR) 10 MG tablet [Pharmacy Med Name: Atorvastatin Calcium Oral Tablet 10 MG] 90 tablet 0     Sig: TAKE ONE TABLET BY MOUTH ONE TIME DAILY       Statins Protocol Passed - 8/28/2023  2:44 PM        Passed - LDL on file in past 12 months     Recent Labs   Lab Test 09/07/22  0751   *             Passed - No abnormal creatine kinase in past 12 months     No lab results found.             Passed - Recent (12 mo) or future (30 days) visit within the authorizing provider's specialty     Patient has had an office visit with the authorizing provider or a provider within the authorizing providers department within the previous 12 mos or has a future within next 30 days. See \"Patient Info\" tab in inbasket, or \"Choose Columns\" in Meds & Orders section of the refill encounter.              Passed - Medication is active on med list        Passed - Patient is age 18 or older        Passed - No active pregnancy on record        Passed - No positive pregnancy test in past 12 months             RADHA FERNANDES RN 08/28/23 2:46 PM  "

## 2023-08-29 ENCOUNTER — HOSPITAL ENCOUNTER (OUTPATIENT)
Dept: MAMMOGRAPHY | Facility: CLINIC | Age: 63
Discharge: HOME | End: 2023-08-29
Attending: FAMILY MEDICINE | Admitting: FAMILY MEDICINE
Payer: MEDICARE

## 2023-08-29 DIAGNOSIS — Z12.31 VISIT FOR SCREENING MAMMOGRAM: ICD-10-CM

## 2023-08-29 PROCEDURE — 77067 SCR MAMMO BI INCL CAD: CPT

## 2023-10-18 ENCOUNTER — OFFICE VISIT (OUTPATIENT)
Dept: FAMILY MEDICINE | Facility: CLINIC | Age: 63
End: 2023-10-18
Payer: COMMERCIAL

## 2023-10-18 VITALS
RESPIRATION RATE: 14 BRPM | BODY MASS INDEX: 21.71 KG/M2 | DIASTOLIC BLOOD PRESSURE: 68 MMHG | HEIGHT: 61 IN | OXYGEN SATURATION: 100 % | TEMPERATURE: 97.6 F | SYSTOLIC BLOOD PRESSURE: 124 MMHG | WEIGHT: 115 LBS | HEART RATE: 67 BPM

## 2023-10-18 DIAGNOSIS — M85.80 OSTEOPENIA, UNSPECIFIED LOCATION: ICD-10-CM

## 2023-10-18 DIAGNOSIS — Z78.0 ASYMPTOMATIC POSTMENOPAUSAL STATUS: ICD-10-CM

## 2023-10-18 DIAGNOSIS — Z00.00 ENCOUNTER FOR MEDICARE ANNUAL WELLNESS EXAM: Primary | ICD-10-CM

## 2023-10-18 DIAGNOSIS — E78.5 HYPERLIPIDEMIA, UNSPECIFIED HYPERLIPIDEMIA TYPE: ICD-10-CM

## 2023-10-18 DIAGNOSIS — R05.9 COUGH, UNSPECIFIED TYPE: ICD-10-CM

## 2023-10-18 DIAGNOSIS — Z12.4 SCREENING FOR CERVICAL CANCER: ICD-10-CM

## 2023-10-18 PROBLEM — Z86.19 HISTORY OF COLD SORES: Status: RESOLVED | Noted: 2022-09-07 | Resolved: 2023-10-18

## 2023-10-18 PROCEDURE — G0009 ADMIN PNEUMOCOCCAL VACCINE: HCPCS | Performed by: FAMILY MEDICINE

## 2023-10-18 PROCEDURE — 36415 COLL VENOUS BLD VENIPUNCTURE: CPT | Performed by: FAMILY MEDICINE

## 2023-10-18 PROCEDURE — 90662 IIV NO PRSV INCREASED AG IM: CPT | Performed by: FAMILY MEDICINE

## 2023-10-18 PROCEDURE — 99214 OFFICE O/P EST MOD 30 MIN: CPT | Mod: 25 | Performed by: FAMILY MEDICINE

## 2023-10-18 PROCEDURE — 80048 BASIC METABOLIC PNL TOTAL CA: CPT | Performed by: FAMILY MEDICINE

## 2023-10-18 PROCEDURE — 90677 PCV20 VACCINE IM: CPT | Performed by: FAMILY MEDICINE

## 2023-10-18 PROCEDURE — G0402 INITIAL PREVENTIVE EXAM: HCPCS | Performed by: FAMILY MEDICINE

## 2023-10-18 PROCEDURE — 82306 VITAMIN D 25 HYDROXY: CPT | Performed by: FAMILY MEDICINE

## 2023-10-18 PROCEDURE — G0145 SCR C/V CYTO,THINLAYER,RESCR: HCPCS | Performed by: FAMILY MEDICINE

## 2023-10-18 PROCEDURE — 87624 HPV HI-RISK TYP POOLED RSLT: CPT | Performed by: FAMILY MEDICINE

## 2023-10-18 PROCEDURE — 80061 LIPID PANEL: CPT | Performed by: FAMILY MEDICINE

## 2023-10-18 PROCEDURE — G0008 ADMIN INFLUENZA VIRUS VAC: HCPCS | Performed by: FAMILY MEDICINE

## 2023-10-18 RX ORDER — RESPIRATORY SYNCYTIAL VIRUS VACCINE 120MCG/0.5
0.5 KIT INTRAMUSCULAR ONCE
Qty: 1 EACH | Refills: 0 | Status: CANCELLED | OUTPATIENT
Start: 2023-10-18 | End: 2023-10-18

## 2023-10-18 RX ORDER — FLUOROURACIL 50 MG/G
CREAM TOPICAL
COMMUNITY
Start: 2023-01-23

## 2023-10-18 ASSESSMENT — ENCOUNTER SYMPTOMS
NAUSEA: 0
HEMATURIA: 0
PALPITATIONS: 0
BREAST MASS: 0
HEMATOCHEZIA: 0
FREQUENCY: 0
PARESTHESIAS: 1
DIZZINESS: 0
SORE THROAT: 0
ARTHRALGIAS: 1
ABDOMINAL PAIN: 0
EYE PAIN: 0
MYALGIAS: 0
COUGH: 0
CHILLS: 0
HEARTBURN: 0
DIARRHEA: 0
NERVOUS/ANXIOUS: 0
JOINT SWELLING: 0
WEAKNESS: 0
DYSURIA: 0
HEADACHES: 0
CONSTIPATION: 0
SHORTNESS OF BREATH: 0
FEVER: 0

## 2023-10-18 ASSESSMENT — ACTIVITIES OF DAILY LIVING (ADL): CURRENT_FUNCTION: NO ASSISTANCE NEEDED

## 2023-10-18 ASSESSMENT — ASTHMA QUESTIONNAIRES: ACT_TOTALSCORE: 25

## 2023-10-18 NOTE — ASSESSMENT & PLAN NOTE
Patient has been holding her Lipitor and would like to re-evaluate her lipid levels off of medication.

## 2023-10-18 NOTE — PROGRESS NOTES
"SUBJECTIVE:   Lela is a 63 year old who presents for Preventive Visit.      9/1/2021     3:48 PM   Additional Questions   Roomed by xl       Are you in the first 12 months of your Medicare coverage?  Yes,  Visual Acuity:  Right Eye: 20/16   Left Eye: 20/20  Both Eyes: 20/16    Healthy Habits:     In general, how would you rate your overall health?  Excellent    Frequency of exercise:  4-5 days/week    Duration of exercise:  45-60 minutes    Do you usually eat at least 4 servings of fruit and vegetables a day, include whole grains    & fiber and avoid regularly eating high fat or \"junk\" foods?  Yes    Taking medications regularly:  Yes    Medication side effects:  None    Ability to successfully perform activities of daily living:  No assistance needed    Home Safety:  No safety concerns identified    Hearing Impairment:  No hearing concerns    In the past 6 months, have you been bothered by leaking of urine? Yes    In general, how would you rate your overall mental or emotional health?  Excellent    Additional concerns today:  No      Today's PHQ-2 Score:       10/18/2023     3:08 PM   PHQ-2 ( 1999 [employer])   Q1: Little interest or pleasure in doing things 0   Q2: Feeling down, depressed or hopeless 0   PHQ-2 Score 0   Q1: Little interest or pleasure in doing things Not at all   Q2: Feeling down, depressed or hopeless Not at all   PHQ-2 Score 0           Have you ever done Advance Care Planning? (For example, a Health Directive, POLST, or a discussion with a medical provider or your loved ones about your wishes): No, advance care planning information given to patient to review.  Patient declined advance care planning discussion at this time.       Fall risk  Fallen 2 or more times in the past year?: No  Any fall with injury in the past year?: No    Cognitive Screening   1) Repeat 3 items (Leader, Season, Table)    2) Clock draw: abnormal  3) 3 item recall: Recalls 3 objects  Results: 3 items recalled: COGNITIVE " IMPAIRMENT LESS LIKELY    Mini-CogTM Copyright NATALIA Cobos. Licensed by the author for use in [facility]; reprinted with permission (emelia@.[facility] [place]). All rights reserved.      Do you have sleep apnea, excessive snoring or daytime drowsiness? : no    Reviewed and updated as needed this visit by clinical staff   Tobacco  Allergies  Meds              Reviewed and updated as needed this visit by Provider                 Social History     Tobacco Use    Smoking status: Former    Smokeless tobacco: Never   Substance Use Topics    Alcohol use: Not on file             10/18/2023     3:07 PM   Alcohol Use   Prescreen: >3 drinks/day or >7 drinks/week? No     Do you have a current opioid prescription? No  Do you use any other controlled substances or medications that are not prescribed by a provider? None        Current providers sharing in care for this patient include:   Patient Care Team:  Jeny Baron MD as PCP - General (Family Practice)  Jeny Baron MD as Assigned PCP  Lacey Ortiz AuD as Audiologist (Audiology)  Elana Willson MD as MD (Otolaryngology)    The following health maintenance items are reviewed in Epic and correct as of today:  Health Maintenance   Topic Date Due    ASTHMA ACTION PLAN  Never done    RSV VACCINE 60+ (1 - 1-dose 60+ series) Never done    COVID-19 Vaccine (5 - 2023-24 season) 09/01/2023    ASTHMA CONTROL TEST  04/18/2024    MAMMO SCREENING  08/29/2024    MEDICARE ANNUAL WELLNESS VISIT  10/18/2024    ANNUAL REVIEW OF HM ORDERS  10/18/2024    FALL RISK ASSESSMENT  10/18/2024    COLORECTAL CANCER SCREENING  05/01/2028    LIPID  10/18/2028    ADVANCE CARE PLANNING  10/18/2028    DTAP/TDAP/TD IMMUNIZATION (3 - Td or Tdap) 02/25/2029    DEXA  09/26/2037    HEPATITIS C SCREENING  Completed    HIV SCREENING  Completed    PHQ-2 (once per calendar year)  Completed    INFLUENZA VACCINE  Completed    Pneumococcal Vaccine: 65+ Years  Completed    ZOSTER  IMMUNIZATION  Completed    IPV IMMUNIZATION  Aged Out    HPV IMMUNIZATION  Aged Out    MENINGITIS IMMUNIZATION  Aged Out    PAP  Discontinued    LUNG CANCER SCREENING  Discontinued     Patient Active Problem List   Diagnosis    Leiomyoma Of The Uterus    Hyperlipidemia    Osteopenia    Atrophic vaginitis    Primary osteoarthritis of hands, bilateral    Family history of colon cancer    Bochdalek hernia    Meibomian gland dysfunction (MGD) of both eyes    Primary open angle glaucoma (POAG) of both eyes, mild stage    Vitreous syneresis of right eye    Cough, unspecified type     Past Surgical History:   Procedure Laterality Date    BUNIONECTOMY      bilateral feet     RELEASE CARPAL TUNNEL      x 2    RHINOPLASTY      ZZC  DELIVERY ONLY      Description:  Section;  Recorded: 2009;  Comments: X 2       Social History     Tobacco Use    Smoking status: Former    Smokeless tobacco: Never   Substance Use Topics    Alcohol use: Not on file     Family History   Problem Relation Age of Onset    Breast Cancer Maternal Aunt     Cancer Maternal Uncle     Cancer Maternal Uncle     Colon Cancer Sister 69.00        BRAF mutation    Breast Cancer Maternal Aunt     Breast Cancer Maternal Aunt     Breast Cancer Maternal Aunt          Current Outpatient Medications   Medication Sig Dispense Refill    cholecalciferol (VITAMIN D3) 25 mcg (1000 units) capsule       conjugated estrogens (PREMARIN) 0.625 MG/GM vaginal cream Place 1 g vaginally twice a week 30 g 3    fish oil-omega-3 fatty acids 1000 MG capsule       fluorouracil (EFUDEX) 5 % external cream       melatonin 5 MG CAPS       multivitamin  with lutein (OCUVITE WITH LUTEIN) CAPS per capsule        No Known Allergies  Mammogram Screening: Mammogram Screening: Recommended mammography every 1-2 years with patient discussion and risk factor consideration    FHS-7:       2021     7:21 AM 2022     1:45 PM 2023     2:34 PM   Breast CA Risk  "Assessment (FHS-7)   Did any of your first-degree relatives have breast or ovarian cancer? No No No   Did any of your relatives have bilateral breast cancer? No No No   Did any man in your family have breast cancer? No No No   Did any woman in your family have breast and ovarian cancer? No No No   Did any woman in your family have breast cancer before age 50 y? Yes Yes No   Do you have 2 or more relatives with breast and/or ovarian cancer? Yes No Yes   Do you have 2 or more relatives with breast and/or bowel cancer? Yes No Yes       Pertinent mammograms are reviewed under the imaging tab.    Review of Systems   Constitutional:  Negative for chills and fever.   HENT:  Negative for congestion, ear pain, hearing loss and sore throat.    Eyes:  Negative for pain and visual disturbance.   Respiratory:  Negative for cough and shortness of breath.    Cardiovascular:  Negative for chest pain, palpitations and peripheral edema.   Gastrointestinal:  Negative for abdominal pain, constipation, diarrhea, heartburn, hematochezia and nausea.   Breasts:  Negative for tenderness, breast mass and discharge.   Genitourinary:  Positive for urgency. Negative for dysuria, frequency, genital sores, hematuria, pelvic pain, vaginal bleeding and vaginal discharge.   Musculoskeletal:  Positive for arthralgias. Negative for joint swelling and myalgias.   Skin:  Negative for rash.   Neurological:  Positive for paresthesias. Negative for dizziness, weakness and headaches.   Psychiatric/Behavioral:  Negative for mood changes. The patient is not nervous/anxious.          OBJECTIVE:   /68 (BP Location: Left arm, Patient Position: Left side, Cuff Size: Adult Regular)   Pulse 67   Temp 97.6  F (36.4  C) (Oral)   Resp 14   Ht 1.537 m (5' 0.5\")   Wt 52.2 kg (115 lb)   SpO2 100%   BMI 22.09 kg/m   Estimated body mass index is 22.09 kg/m  as calculated from the following:    Height as of this encounter: 1.537 m (5' 0.5\").    Weight as of " this encounter: 52.2 kg (115 lb).  Physical Exam  GENERAL APPEARANCE: healthy, alert and no distress  EYES: Eyes grossly normal to inspection, PERRL and conjunctivae and sclerae normal  HENT: ear canals and TM's normal, nose and mouth without ulcers or lesions, oropharynx clear and oral mucous membranes moist  NECK: no adenopathy, no asymmetry, masses, or scars and thyroid normal to palpation  RESP: lungs clear to auscultation - no rales, rhonchi or wheezes  BREAST: normal without masses, tenderness or nipple discharge and no palpable axillary masses or adenopathy  CV: regular rate and rhythm, normal S1 S2, no S3 or S4, no murmur, click or rub, no peripheral edema and peripheral pulses strong  ABDOMEN: soft, nontender, no hepatosplenomegaly, no masses and bowel sounds normal   (female): normal female external genitalia, normal urethral meatus, vaginal mucosal atrophy noted, normal cervix  MS: no musculoskeletal defects are noted and gait is age appropriate without ataxia  SKIN: no suspicious lesions or rashes  NEURO: Normal strength and tone, sensory exam grossly normal, mentation intact and speech normal  PSYCH: mentation appears normal and affect normal/bright    Diagnostic Test Results:  Labs reviewed in Epic    ASSESSMENT / PLAN:     Problem List Items Addressed This Visit       Hyperlipidemia     Patient has been holding her Lipitor and would like to re-evaluate her lipid levels off of medication.          Relevant Orders    Lipid Profile (Chol, Trig, HDL, LDL calc) (Completed)    Osteopenia     I reviewed her last DEXA scan and ordered a vitamin D level today.         Relevant Orders    Vitamin D Deficiency (Completed)    Basic metabolic panel  (Ca, Cl, CO2, Creat, Gluc, K, Na, BUN) (Completed)    Cough, unspecified type     The patient's history of coughing with eating and drinking sounds concerning for some microaspiration.  I recommended a swallow study as the next step to investigate this further.          Relevant Orders    XR Video Swallow with SLP or OT - Order with Speech Therapy Referral     Other Visit Diagnoses       Encounter for Medicare annual wellness exam    -  Primary    Asymptomatic postmenopausal status        Relevant Medications    conjugated estrogens (PREMARIN) 0.625 MG/GM vaginal cream    Screening for cervical cancer        Relevant Orders    Pap screen with HPV - recommended age 30 - 65 years    HPV Hold (Lab Only)                  COUNSELING:  Reviewed preventive health counseling, as reflected in patient instructions       Regular exercise       Healthy diet/nutrition       Dental care       Osteoporosis prevention/bone health       Colon cancer screening        She reports that she has quit smoking. She has never used smokeless tobacco.      Appropriate preventive services were discussed with this patient, including applicable screening as appropriate for fall prevention, nutrition, physical activity, Tobacco-use cessation, weight loss and cognition.  Checklist reviewing preventive services available has been given to the patient.    Reviewed patients plan of care and provided an AVS. The Basic Care Plan (routine screening as documented in Health Maintenance) for Lela meets the Care Plan requirement. This Care Plan has been established and reviewed with the Patient.        KATIE ZUÑIGA MD  [facility] [place]    Identified Health Risks:  I have reviewed Opioid Use Disorder and Substance Use Disorder risk factors and made any needed referrals.   Information on urinary incontinence and treatment options given to patient.

## 2023-10-18 NOTE — PATIENT INSTRUCTIONS
Patient Education   Personalized Prevention Plan  You are due for the preventive services outlined below.  Your care team is available to assist you in scheduling these services.  If you have already completed any of these items, please share that information with your care team to update in your medical record.  Health Maintenance Due   Topic Date Due     Asthma Action Plan - yearly  Never done     RSV VACCINE 60+ (1 - 1-dose 60+ series) Never done     COVID-19 Vaccine (5 - 2023-24 season) 09/01/2023        Patient Education   Personalized Prevention Plan  You are due for the preventive services outlined below.  Your care team is available to assist you in scheduling these services.  If you have already completed any of these items, please share that information with your care team to update in your medical record.  Health Maintenance Due   Topic Date Due     Asthma Action Plan - yearly  Never done     RSV VACCINE 60+ (1 - 1-dose 60+ series) Never done     COVID-19 Vaccine (5 - 2023-24 season) 09/01/2023     Bladder Training: Care Instructions  Your Care Instructions     Bladder training is used to treat urge incontinence and stress incontinence. Urge incontinence means that the need to urinate comes on so fast that you can't get to a toilet in time. Stress incontinence means that you leak urine because of pressure on your bladder. For example, it may happen when you laugh, cough, or lift something heavy.  Bladder training can increase how long you can wait before you have to urinate. It can also help your bladder hold more urine. And it can give you better control over the urge to urinate.  It is important to remember that bladder training takes a few weeks to a few months to make a difference. You may not see results right away, but don't give up.  Follow-up care is a key part of your treatment and safety. Be sure to make and go to all appointments, and call your doctor if you are having problems. It's also a  good idea to know your test results and keep a list of the medicines you take.  How can you care for yourself at home?  Work with your doctor to come up with a bladder training program that is right for you. You may use one or more of the following methods.  Delayed urination  In the beginning, try to keep from urinating for 5 minutes after you first feel the need to go.  While you wait, take deep, slow breaths to relax. Kegel exercises can also help you delay the need to go to the bathroom.  After some practice, when you can easily wait 5 minutes to urinate, try to wait 10 minutes before you urinate.  Slowly increase the waiting period until you are able to control when you have to urinate.  Scheduled urination  Empty your bladder when you first wake up in the morning.  Schedule times throughout the day when you will urinate.  Start by going to the bathroom every hour, even if you don't need to go.  Slowly increase the time between trips to the bathroom.  When you have found a schedule that works well for you, keep doing it.  If you wake up during the night and have to urinate, do it. Apply your schedule to waking hours only.  Kegel exercises  These tighten and strengthen pelvic muscles, which can help you control the flow of urine. (If doing these exercises causes pain, stop doing them and talk with your doctor.) To do Kegel exercises:  Squeeze your muscles as if you were trying not to pass gas. Or squeeze your muscles as if you were stopping the flow of urine. Your belly, legs, and buttocks shouldn't move.  Hold the squeeze for 3 seconds, then relax for 5 to 10 seconds.  Start with 3 seconds, then add 1 second each week until you are able to squeeze for 10 seconds.  Repeat the exercise 10 times a session. Do 3 to 8 sessions a day.  When should you call for help?  Watch closely for changes in your health, and be sure to contact your doctor if:    Your incontinence is getting worse.     You do not get better as  "expected.   Where can you learn more?  Go to https://www.[facility].net/patiented  Enter V684 in the search box to learn more about \"Bladder Training: Care Instructions.\"  Current as of: March 1, 2023               Content Version: 13.7    4303-8952 [employer].   Care instructions adapted under license by your healthcare professional. If you have questions about a medical condition or this instruction, always ask your healthcare professional. [employer] disclaims any warranty or liability for your use of this information.         "

## 2023-10-19 LAB
ANION GAP SERPL CALCULATED.3IONS-SCNC: 12 MMOL/L (ref 7–15)
BUN SERPL-MCNC: 17.4 MG/DL (ref 8–23)
CALCIUM SERPL-MCNC: 10 MG/DL (ref 8.8–10.2)
CHLORIDE SERPL-SCNC: 102 MMOL/L (ref 98–107)
CHOLEST SERPL-MCNC: 233 MG/DL
CREAT SERPL-MCNC: 0.75 MG/DL (ref 0.51–0.95)
DEPRECATED HCO3 PLAS-SCNC: 28 MMOL/L (ref 22–29)
EGFRCR SERPLBLD CKD-EPI 2021: 88 ML/MIN/1.73M2
GLUCOSE SERPL-MCNC: 74 MG/DL (ref 70–99)
HDLC SERPL-MCNC: 71 MG/DL
LDLC SERPL CALC-MCNC: 147 MG/DL
NONHDLC SERPL-MCNC: 162 MG/DL
POTASSIUM SERPL-SCNC: 3.5 MMOL/L (ref 3.4–5.3)
SODIUM SERPL-SCNC: 142 MMOL/L (ref 135–145)
TRIGL SERPL-MCNC: 74 MG/DL
VIT D+METAB SERPL-MCNC: 72 NG/ML (ref 20–50)

## 2023-10-20 PROBLEM — R05.9 COUGH, UNSPECIFIED TYPE: Status: ACTIVE | Noted: 2023-10-20

## 2023-10-20 NOTE — ASSESSMENT & PLAN NOTE
The patient's history of coughing with eating and drinking sounds concerning for some microaspiration.  I recommended a swallow study as the next step to investigate this further.

## 2023-10-23 ENCOUNTER — MYC MEDICAL ADVICE (OUTPATIENT)
Dept: FAMILY MEDICINE | Facility: CLINIC | Age: 63
End: 2023-10-23
Payer: COMMERCIAL

## 2023-10-23 DIAGNOSIS — E67.3 HIGH VITAMIN D LEVEL: ICD-10-CM

## 2023-10-23 DIAGNOSIS — E78.5 HYPERLIPIDEMIA, UNSPECIFIED HYPERLIPIDEMIA TYPE: Primary | ICD-10-CM

## 2023-10-23 LAB
BKR LAB AP GYN ADEQUACY: NORMAL
BKR LAB AP GYN INTERPRETATION: NORMAL
BKR LAB AP HPV REFLEX: NORMAL
BKR LAB AP PREVIOUS ABNORMAL: NORMAL
PATH REPORT.COMMENTS IMP SPEC: NORMAL
PATH REPORT.COMMENTS IMP SPEC: NORMAL
PATH REPORT.RELEVANT HX SPEC: NORMAL

## 2023-10-23 RX ORDER — ATORVASTATIN CALCIUM 10 MG/1
10 TABLET, FILM COATED ORAL DAILY
Start: 2023-10-23 | End: 2024-03-15

## 2023-10-23 NOTE — TELEPHONE ENCOUNTER
Discussed with patient and based on her concerning family history of stroke I did recommend resuming Lipitor 10 mg daily.  I also recommended holding all vitamin D supplementation for 3 months and then rechecking a vitamin D level.  We discussed that I would not recommend supplementing potassium at this time but that it is reasonable to recheck a level at the same time as the vitamin D.

## 2023-10-25 PROBLEM — Z12.4 CERVICAL CANCER SCREENING: Status: ACTIVE | Noted: 2023-10-25

## 2023-10-25 LAB
HUMAN PAPILLOMA VIRUS 16 DNA: NEGATIVE
HUMAN PAPILLOMA VIRUS 18 DNA: NEGATIVE
HUMAN PAPILLOMA VIRUS FINAL DIAGNOSIS: NORMAL
HUMAN PAPILLOMA VIRUS OTHER HR: NEGATIVE

## 2023-10-30 ENCOUNTER — HOSPITAL ENCOUNTER (OUTPATIENT)
Dept: RADIOLOGY | Facility: CLINIC | Age: 63
Discharge: HOME | End: 2023-10-30
Attending: FAMILY MEDICINE
Payer: COMMERCIAL

## 2023-10-30 ENCOUNTER — HOSPITAL ENCOUNTER (OUTPATIENT)
Dept: SPEECH THERAPY | Facility: CLINIC | Age: 63
Discharge: HOME | End: 2023-10-30
Attending: FAMILY MEDICINE
Payer: COMMERCIAL

## 2023-10-30 DIAGNOSIS — R05.9 COUGH, UNSPECIFIED TYPE: ICD-10-CM

## 2023-10-30 PROCEDURE — 92611 MOTION FLUOROSCOPY/SWALLOW: CPT | Mod: GN

## 2023-10-30 PROCEDURE — 74230 X-RAY XM SWLNG FUNCJ C+: CPT

## 2023-10-30 RX ORDER — BARIUM SULFATE 400 MG/ML
SUSPENSION ORAL ONCE
Status: COMPLETED | OUTPATIENT
Start: 2023-10-30 | End: 2023-10-30

## 2023-10-30 RX ADMIN — BARIUM SULFATE 30 ML: 400 SUSPENSION ORAL at 09:54

## 2023-12-13 ENCOUNTER — LAB (OUTPATIENT)
Dept: LAB | Facility: CLINIC | Age: 63
End: 2023-12-13
Payer: COMMERCIAL

## 2023-12-13 DIAGNOSIS — E78.5 HYPERLIPIDEMIA, UNSPECIFIED HYPERLIPIDEMIA TYPE: ICD-10-CM

## 2023-12-13 DIAGNOSIS — E67.3 HIGH VITAMIN D LEVEL: ICD-10-CM

## 2023-12-13 LAB
ALT SERPL W P-5'-P-CCNC: 24 U/L (ref 0–50)
ANION GAP SERPL CALCULATED.3IONS-SCNC: 9 MMOL/L (ref 7–15)
BUN SERPL-MCNC: 16.7 MG/DL (ref 8–23)
CALCIUM SERPL-MCNC: 9.6 MG/DL (ref 8.8–10.2)
CHLORIDE SERPL-SCNC: 104 MMOL/L (ref 98–107)
CHOLEST SERPL-MCNC: 208 MG/DL
CREAT SERPL-MCNC: 0.86 MG/DL (ref 0.51–0.95)
DEPRECATED HCO3 PLAS-SCNC: 28 MMOL/L (ref 22–29)
EGFRCR SERPLBLD CKD-EPI 2021: 75 ML/MIN/1.73M2
FASTING STATUS PATIENT QL REPORTED: YES
GLUCOSE SERPL-MCNC: 142 MG/DL (ref 70–99)
HDLC SERPL-MCNC: 74 MG/DL
LDLC SERPL CALC-MCNC: 120 MG/DL
NONHDLC SERPL-MCNC: 134 MG/DL
POTASSIUM SERPL-SCNC: 4.8 MMOL/L (ref 3.4–5.3)
SODIUM SERPL-SCNC: 141 MMOL/L (ref 135–145)
TRIGL SERPL-MCNC: 72 MG/DL
VIT D+METAB SERPL-MCNC: 56 NG/ML (ref 20–50)

## 2023-12-13 PROCEDURE — 84460 ALANINE AMINO (ALT) (SGPT): CPT

## 2023-12-13 PROCEDURE — 80061 LIPID PANEL: CPT

## 2023-12-13 PROCEDURE — 82306 VITAMIN D 25 HYDROXY: CPT

## 2023-12-13 PROCEDURE — 80048 BASIC METABOLIC PNL TOTAL CA: CPT

## 2023-12-13 PROCEDURE — 36415 COLL VENOUS BLD VENIPUNCTURE: CPT

## 2023-12-14 DIAGNOSIS — R73.9 HYPERGLYCEMIA: ICD-10-CM

## 2023-12-14 DIAGNOSIS — E67.3 HIGH VITAMIN D LEVEL: ICD-10-CM

## 2023-12-14 DIAGNOSIS — E78.5 HYPERLIPIDEMIA, UNSPECIFIED HYPERLIPIDEMIA TYPE: Primary | ICD-10-CM

## 2024-01-11 ENCOUNTER — TRANSFERRED RECORDS (OUTPATIENT)
Dept: HEALTH INFORMATION MANAGEMENT | Facility: CLINIC | Age: 64
End: 2024-01-11
Payer: COMMERCIAL

## 2024-01-17 ENCOUNTER — NURSE TRIAGE (OUTPATIENT)
Dept: FAMILY MEDICINE | Facility: CLINIC | Age: 64
End: 2024-01-17
Payer: COMMERCIAL

## 2024-01-17 DIAGNOSIS — U07.1 INFECTION DUE TO 2019 NOVEL CORONAVIRUS: Primary | ICD-10-CM

## 2024-01-17 NOTE — TELEPHONE ENCOUNTER
RN COVID TREATMENT VISIT  01/17/24    The patient has been triaged and does not require a higher level of care.    Lela Hobbsus  63 year old  Current weight? 115    Has the patient been seen by a primary care provider at an [facility] or [facility] within the past two years? Yes.   Have you been in close proximity to/do you have a known exposure to a person with a confirmed case of influenza? No.     General treatment eligibility:  Date of positive COVID test (PCR or at home)?  1/17/24    Are you or have you been hospitalized for this COVID-19 infection? No.   Have you received monoclonal antibodies or antiviral treatment for COVID-19 since this positive test? No.   Do you have any of the following conditions that place you at risk of being very sick from COVID-19?   - Age 50 years or older  Yes, patient has at least one high risk condition as noted above.     Current COVID symptoms:   - congestion or runny nose  Yes. Patient has at least one symptom as selected.     How many days since symptoms started? 5 days or less. Established patient, 12 years or older weighing at least 88.2 lbs, who has symptoms that started in the past 5 days, has not been hospitalized nor received treatment already, and is at risk for being very sick from COVID-19.     Treatment eligibility by RN:  Are you currently pregnant or nursing? No  Do you have a clinically significant hypersensitivity to nirmatrelvir or ritonavir, or toxic epidermal necrolysis (TEN) or Mancilla-Jose Antonio Syndrome? No  Do you have a history of hepatitis, any hepatic impairment on the Problem List (such as Child-Rush Class C, cirrhosis, fatty liver disease, alcoholic liver disease), or was the last liver lab (hepatic panel, ALT, AST, ALK Phos, bilirubin) elevated in the past 6 months? No  Do you have any history of severe renal impairment (eGFR < 30mL/min)? No    Is patient eligible to continue? Yes, patient meets all eligibility  requirements for the RN COVID treatment (as denoted by all no responses above).     Current Outpatient Medications   Medication Sig Dispense Refill    atorvastatin (LIPITOR) 10 MG tablet Take 1 tablet (10 mg) by mouth daily      cholecalciferol (VITAMIN D3) 25 mcg (1000 units) capsule       conjugated estrogens (PREMARIN) 0.625 MG/GM vaginal cream Place 1 g vaginally twice a week 30 g 3    fish oil-omega-3 fatty acids 1000 MG capsule       fluorouracil (EFUDEX) 5 % external cream       melatonin 5 MG CAPS       multivitamin  with lutein (OCUVITE WITH LUTEIN) CAPS per capsule          Medications from List 1 of the standing order (on medications that exclude the use of Paxlovid) that patient is taking: NONE. Is patient taking Estelle's Wort? No  Is patient taking Estelle's Wort or any meds from List 1? No.   Medications from List 2 of the standing order (on meds that provider needs to adjust) that patient is taking: NONE. Is patient on any of the meds from List 2? No.   Medications from List 3 of standing order (on meds that a RN needs to adjust) that patient is taking: atorvastatin (Lipitor): Instructed patient to stop atorvastatin while taking Paxlovid and restart atorvastatin 1 day after the completion of Paxlovid.  Is patient on any meds from List 3? Yes. Patient is on meds from list 3. No meds require a provider visit and at least one med required RN to adjust.     Paxlovid has an approximate 90% reduction in hospitalization. Paxlovid can possibly cause altered sense of taste, diarrhea (loose, watery stools), high blood pressure, muscle aches.     Would patient like a Paxlovid prescription?   Yes.   Lab Results   Component Value Date    GFRESTIMATED 75 12/13/2023       Was last eGFR reduced? No, eGFR 60 or greater/ No Result on record. Patient can receive the normal renal function dose. Paxlovid Rx sent to [facility] in [place]    Temporary change to home medications:   atorvastatin (Lipitor):  Instructed patient to stop atorvastatin while taking Paxlovid and restart atorvastatin 1 day after the completion of Paxlovid.   All medication adjustments (holds, etc) were discussed with the patient and patient was asked to repeat back (teachback) their med adjustment.  Did patient understand med adjustment? Yes, patient repeated back and understood correctly.        Reviewed the following instructions with the patient:    Paxlovid (nimatrelvir and ritonavir)    How it works  Two medicines (nirmatrelvir and ritonavir) are taken together. They stop the virus from growing. Less amount of virus is easier for your body to fight.    How to take  Medicine comes in a daily container with both medicine tablets. Take by mouth twice daily (once in the morning, once at night) for 5 days.  The number of tablets to take varies by patient.  Don't chew or break capsules. Swallow whole.    When to take  Take as soon as possible after positive COVID-19 test result, and within 5 days of your first symptoms.    Possible side effects  Can cause altered sense of taste, diarrhea (loose, watery stools), high blood pressure, muscle aches.    Lea Montoya RN       Reason for Disposition   [1] HIGH RISK patient (e.g., weak immune system, age > 64 years, obesity with BMI 30 or higher, pregnant, chronic lung disease or other chronic medical condition) AND [2] COVID symptoms (e.g., cough, fever)  (Exceptions: Already seen by PCP and no new or worsening symptoms.)    Additional Information   Negative: SEVERE difficulty breathing (e.g., struggling for each breath, speaks in single words)   Negative: Difficult to awaken or acting confused (e.g., disoriented, slurred speech)   Negative: Bluish (or gray) lips or face now   Negative: Shock suspected (e.g., cold/pale/clammy skin, too weak to stand, low BP, rapid pulse)   Negative: Sounds like a life-threatening emergency to the triager   Negative: [1] Diagnosed or suspected COVID-19 AND [2] symptoms  lasting 3 or more weeks   Negative: [1] COVID-19 exposure AND [2] no symptoms   Negative: COVID-19 vaccine reaction suspected (e.g., fever, headache, muscle aches) occurring 1 to 3 days after getting vaccine   Negative: COVID-19 vaccine, questions about   Negative: [1] Lives with someone known to have influenza (flu test positive) AND [2] flu-like symptoms (e.g., cough, runny nose, sore throat, SOB; with or without fever)   Negative: [1] Possible COVID-19 symptoms AND [2] triager concerned about severity of symptoms or other causes   Negative: COVID-19 and breastfeeding, questions about   Negative: SEVERE or constant chest pain or pressure  (Exception: Mild central chest pain, present only when coughing.)   Negative: MODERATE difficulty breathing (e.g., speaks in phrases, SOB even at rest, pulse 100-120)   Negative: [1] Headache AND [2] stiff neck (can't touch chin to chest)   Negative: Oxygen level (e.g., pulse oximetry) 90 percent or lower   Negative: Chest pain or pressure  (Exception: MILD central chest pain, present only when coughing.)   Negative: [1] Drinking very little AND [2] dehydration suspected (e.g., no urine > 12 hours, very dry mouth, very lightheaded)   Negative: Patient sounds very sick or weak to the triager   Negative: MILD difficulty breathing (e.g., minimal/no SOB at rest, SOB with walking, pulse <100)   Negative: Fever > 103 F (39.4 C)   Negative: [1] Fever > 101 F (38.3 C) AND [2] age > 60 years   Negative: [1] Fever > 100.0 F (37.8 C) AND [2] bedridden (e.g., CVA, chronic illness, recovering from surgery)   Negative: Oxygen level (e.g., pulse oximetry) 91 to 94 percent    Protocols used: Coronavirus (COVID-19) Diagnosed or [id number]

## 2024-01-17 NOTE — TELEPHONE ENCOUNTER
COVID Positive/Requesting COVID treatment    Patient is positive for COVID and requesting treatment options.    Date of positive COVID test (PCR or at home)? 1/17/24  Current COVID symptoms: cough and congestion or runny nose  Date COVID symptoms began: 1/16/24    Message should be routed to clinic RN pool. Best phone number to use for call back: [phone]

## 2024-03-13 ENCOUNTER — LAB (OUTPATIENT)
Dept: LAB | Facility: CLINIC | Age: 64
End: 2024-03-13
Payer: COMMERCIAL

## 2024-03-13 DIAGNOSIS — E78.5 HYPERLIPIDEMIA, UNSPECIFIED HYPERLIPIDEMIA TYPE: ICD-10-CM

## 2024-03-13 DIAGNOSIS — R73.9 HYPERGLYCEMIA: ICD-10-CM

## 2024-03-13 DIAGNOSIS — E67.3 HIGH VITAMIN D LEVEL: ICD-10-CM

## 2024-03-13 LAB
HBA1C MFR BLD: 5.5 % (ref 0–5.6)
LDLC SERPL DIRECT ASSAY-MCNC: 97 MG/DL
VIT D+METAB SERPL-MCNC: 51 NG/ML (ref 20–50)

## 2024-03-13 PROCEDURE — 82306 VITAMIN D 25 HYDROXY: CPT

## 2024-03-13 PROCEDURE — 36415 COLL VENOUS BLD VENIPUNCTURE: CPT

## 2024-03-13 PROCEDURE — 83036 HEMOGLOBIN GLYCOSYLATED A1C: CPT

## 2024-03-13 PROCEDURE — 83721 ASSAY OF BLOOD LIPOPROTEIN: CPT

## 2024-03-15 ENCOUNTER — MYC MEDICAL ADVICE (OUTPATIENT)
Dept: FAMILY MEDICINE | Facility: CLINIC | Age: 64
End: 2024-03-15
Payer: COMMERCIAL

## 2024-03-15 DIAGNOSIS — E78.5 HYPERLIPIDEMIA, UNSPECIFIED HYPERLIPIDEMIA TYPE: ICD-10-CM

## 2024-03-15 RX ORDER — ATORVASTATIN CALCIUM 10 MG/1
10 TABLET, FILM COATED ORAL DAILY
Qty: 90 TABLET | Refills: 1 | Status: SHIPPED | OUTPATIENT
Start: 2024-03-15 | End: 2024-03-22

## 2024-03-22 RX ORDER — ATORVASTATIN CALCIUM 10 MG/1
10 TABLET, FILM COATED ORAL DAILY
Qty: 90 TABLET | Refills: 3 | Status: SHIPPED | OUTPATIENT
Start: 2024-03-22

## 2024-07-30 ENCOUNTER — PATIENT OUTREACH (OUTPATIENT)
Dept: CARE COORDINATION | Facility: CLINIC | Age: 64
End: 2024-07-30
Payer: COMMERCIAL

## 2024-08-30 ENCOUNTER — HOSPITAL ENCOUNTER (OUTPATIENT)
Dept: MAMMOGRAPHY | Facility: CLINIC | Age: 64
Discharge: HOME | End: 2024-08-30
Attending: FAMILY MEDICINE | Admitting: FAMILY MEDICINE
Payer: COMMERCIAL

## 2024-08-30 DIAGNOSIS — Z12.31 VISIT FOR SCREENING MAMMOGRAM: ICD-10-CM

## 2024-08-30 PROCEDURE — 77063 BREAST TOMOSYNTHESIS BI: CPT

## 2024-11-11 ENCOUNTER — MYC MEDICAL ADVICE (OUTPATIENT)
Dept: FAMILY MEDICINE | Facility: CLINIC | Age: 64
End: 2024-11-11

## 2024-11-11 ENCOUNTER — OFFICE VISIT (OUTPATIENT)
Dept: FAMILY MEDICINE | Facility: CLINIC | Age: 64
End: 2024-11-11
Attending: FAMILY MEDICINE
Payer: COMMERCIAL

## 2024-11-11 VITALS
RESPIRATION RATE: 14 BRPM | BODY MASS INDEX: 22.09 KG/M2 | SYSTOLIC BLOOD PRESSURE: 138 MMHG | WEIGHT: 117 LBS | TEMPERATURE: 97.7 F | DIASTOLIC BLOOD PRESSURE: 84 MMHG | HEIGHT: 61 IN | OXYGEN SATURATION: 95 % | HEART RATE: 67 BPM

## 2024-11-11 DIAGNOSIS — Z80.0 FAMILY HISTORY OF COLON CANCER: ICD-10-CM

## 2024-11-11 DIAGNOSIS — Z00.00 ENCOUNTER FOR MEDICARE ANNUAL WELLNESS EXAM: Primary | ICD-10-CM

## 2024-11-11 DIAGNOSIS — Z78.0 POSTMENOPAUSAL STATUS: ICD-10-CM

## 2024-11-11 DIAGNOSIS — N95.2 ATROPHIC VAGINITIS: Primary | ICD-10-CM

## 2024-11-11 DIAGNOSIS — N95.2 ATROPHIC VAGINITIS: ICD-10-CM

## 2024-11-11 DIAGNOSIS — E78.5 HYPERLIPIDEMIA, UNSPECIFIED HYPERLIPIDEMIA TYPE: ICD-10-CM

## 2024-11-11 DIAGNOSIS — M85.80 OSTEOPENIA, UNSPECIFIED LOCATION: ICD-10-CM

## 2024-11-11 PROBLEM — R05.9 COUGH, UNSPECIFIED TYPE: Status: RESOLVED | Noted: 2023-10-20 | Resolved: 2024-11-11

## 2024-11-11 PROBLEM — Z12.4 CERVICAL CANCER SCREENING: Status: RESOLVED | Noted: 2023-10-25 | Resolved: 2024-11-11

## 2024-11-11 PROCEDURE — G0439 PPPS, SUBSEQ VISIT: HCPCS | Performed by: FAMILY MEDICINE

## 2024-11-11 PROCEDURE — 82306 VITAMIN D 25 HYDROXY: CPT | Performed by: FAMILY MEDICINE

## 2024-11-11 PROCEDURE — G0008 ADMIN INFLUENZA VIRUS VAC: HCPCS | Performed by: FAMILY MEDICINE

## 2024-11-11 PROCEDURE — 36415 COLL VENOUS BLD VENIPUNCTURE: CPT | Performed by: FAMILY MEDICINE

## 2024-11-11 PROCEDURE — 90662 IIV NO PRSV INCREASED AG IM: CPT | Performed by: FAMILY MEDICINE

## 2024-11-11 PROCEDURE — 99213 OFFICE O/P EST LOW 20 MIN: CPT | Mod: 25 | Performed by: FAMILY MEDICINE

## 2024-11-11 PROCEDURE — 80053 COMPREHEN METABOLIC PANEL: CPT | Performed by: FAMILY MEDICINE

## 2024-11-11 PROCEDURE — 80061 LIPID PANEL: CPT | Performed by: FAMILY MEDICINE

## 2024-11-11 RX ORDER — ESTRADIOL 0.1 MG/G
1 CREAM VAGINAL
Qty: 42.5 G | Refills: 1 | Status: SHIPPED | OUTPATIENT
Start: 2024-11-11

## 2024-11-11 SDOH — HEALTH STABILITY: PHYSICAL HEALTH: ON AVERAGE, HOW MANY DAYS PER WEEK DO YOU ENGAGE IN MODERATE TO STRENUOUS EXERCISE (LIKE A BRISK WALK)?: 3 DAYS

## 2024-11-11 ASSESSMENT — ASTHMA QUESTIONNAIRES
ACT_TOTALSCORE: 25
ACT_TOTALSCORE: 25
QUESTION_1 LAST FOUR WEEKS HOW MUCH OF THE TIME DID YOUR ASTHMA KEEP YOU FROM GETTING AS MUCH DONE AT WORK, SCHOOL OR AT HOME: NONE OF THE TIME
QUESTION_3 LAST FOUR WEEKS HOW OFTEN DID YOUR ASTHMA SYMPTOMS (WHEEZING, COUGHING, SHORTNESS OF BREATH, CHEST TIGHTNESS OR PAIN) WAKE YOU UP AT NIGHT OR EARLIER THAN USUAL IN THE MORNING: NOT AT ALL
QUESTION_5 LAST FOUR WEEKS HOW WOULD YOU RATE YOUR ASTHMA CONTROL: COMPLETELY CONTROLLED
QUESTION_4 LAST FOUR WEEKS HOW OFTEN HAVE YOU USED YOUR RESCUE INHALER OR NEBULIZER MEDICATION (SUCH AS ALBUTEROL): NOT AT ALL
QUESTION_2 LAST FOUR WEEKS HOW OFTEN HAVE YOU HAD SHORTNESS OF BREATH: NOT AT ALL

## 2024-11-11 ASSESSMENT — SOCIAL DETERMINANTS OF HEALTH (SDOH): HOW OFTEN DO YOU GET TOGETHER WITH FRIENDS OR RELATIVES?: MORE THAN THREE TIMES A WEEK

## 2024-11-11 NOTE — ASSESSMENT & PLAN NOTE
The patient has a family history of colon cancer.  Her last colonoscopy was in May 2023.  The gastroenterologist recommended a 5-year interval.  She question that today and wondered if 3 years would be a better interval.  I encouraged her to call [facility] and have the physician who performed the colonoscopy weigh in on her question.

## 2024-11-11 NOTE — PATIENT INSTRUCTIONS
Patient Education   Preventive Care Advice   This is general advice given by our system to help you stay healthy. However, your care team may have specific advice just for you. Please talk to your care team about your preventive care needs.  Nutrition  Eat 5 or more servings of fruits and vegetables each day.  Try wheat bread, brown rice and whole grain pasta (instead of white bread, rice, and pasta).  Get enough calcium and vitamin D. Check the label on foods and aim for 100% of the RDA (recommended daily allowance).  Lifestyle  Exercise at least 150 minutes each week  (30 minutes a day, 5 days a week).  Do muscle strengthening activities 2 days a week. These help control your weight and prevent disease.  No smoking.  Wear sunscreen to prevent skin cancer.  Have a dental exam and cleaning every 6 months.  Yearly exams  See your health care team every year to talk about:  Any changes in your health.  Any medicines your care team has prescribed.  Preventive care, family planning, and ways to prevent chronic diseases.  Shots (vaccines)   HPV shots (up to age 26), if you've never had them before.  Hepatitis B shots (up to age 59), if you've never had them before.  COVID-19 shot: Get this shot when it's due.  Flu shot: Get a flu shot every year.  Tetanus shot: Get a tetanus shot every 10 years.  Pneumococcal, hepatitis A, and RSV shots: Ask your care team if you need these based on your risk.  Shingles shot (for age 50 and up)  General health tests  Diabetes screening:  Starting at age 35, Get screened for diabetes at least every 3 years.  If you are younger than age 35, ask your care team if you should be screened for diabetes.  Cholesterol test: At age 39, start having a cholesterol test every 5 years, or more often if advised.  Bone density scan (DEXA): At age 50, ask your care team if you should have this scan for osteoporosis (brittle bones).  Hepatitis C: Get tested at least once in your life.  STIs (sexually  transmitted infections)  Before age 24: Ask your care team if you should be screened for STIs.  After age 24: Get screened for STIs if you're at risk. You are at risk for STIs (including HIV) if:  You are sexually active with more than one person.  You don't use condoms every time.  You or a partner was diagnosed with a sexually transmitted infection.  If you are at risk for HIV, ask about PrEP medicine to prevent HIV.  Get tested for HIV at least once in your life, whether you are at risk for HIV or not.  Cancer screening tests  Cervical cancer screening: If you have a cervix, begin getting regular cervical cancer screening tests starting at age 21.  Breast cancer scan (mammogram): If you've ever had breasts, begin having regular mammograms starting at age 40. This is a scan to check for breast cancer.  Colon cancer screening: It is important to start screening for colon cancer at age 45.  Have a colonoscopy test every 10 years (or more often if you're at risk) Or, ask your provider about stool tests like a FIT test every year or Cologuard test every 3 years.  To learn more about your testing options, visit:   .  For help making a decision, visit:   https://bit.ly/tl31552.  Prostate cancer screening test: If you have a prostate, ask your care team if a prostate cancer screening test (PSA) at age 55 is right for you.  Lung cancer screening: If you are a current or former smoker ages 50 to 80, ask your care team if ongoing lung cancer screenings are right for you.  For informational purposes only. Not to replace the advice of your health care provider. Copyright   2023 [facility] [employer]. All rights reserved. Clinically reviewed by the [facility] Transitions Program. [employer] [id number] - REV 01/24.  Bladder Training: Care Instructions  Your Care Instructions     Bladder training is used to treat urge incontinence and stress incontinence. Urge incontinence means that the need to urinate comes on so fast  that you can't get to a toilet in time. Stress incontinence means that you leak urine because of pressure on your bladder. For example, it may happen when you laugh, cough, or lift something heavy.  Bladder training can increase how long you can wait before you have to urinate. It can also help your bladder hold more urine. And it can give you better control over the urge to urinate.  It is important to remember that bladder training takes a few weeks to a few months to make a difference. You may not see results right away, but don't give up.  Follow-up care is a key part of your treatment and safety. Be sure to make and go to all appointments, and call your doctor if you are having problems. It's also a good idea to know your test results and keep a list of the medicines you take.  How can you care for yourself at home?  Work with your doctor to come up with a bladder training program that is right for you. You may use one or more of the following methods.  Delayed urination  In the beginning, try to keep from urinating for 5 minutes after you first feel the need to go.  While you wait, take deep, slow breaths to relax. Kegel exercises can also help you delay the need to go to the bathroom.  After some practice, when you can easily wait 5 minutes to urinate, try to wait 10 minutes before you urinate.  Slowly increase the waiting period until you are able to control when you have to urinate.  Scheduled urination  Empty your bladder when you first wake up in the morning.  Schedule times throughout the day when you will urinate.  Start by going to the bathroom every hour, even if you don't need to go.  Slowly increase the time between trips to the bathroom.  When you have found a schedule that works well for you, keep doing it.  If you wake up during the night and have to urinate, do it. Apply your schedule to waking hours only.  Kegel exercises  These tighten and strengthen pelvic muscles, which can help you control  "the flow of urine. (If doing these exercises causes pain, stop doing them and talk with your doctor.) To do Kegel exercises:  Squeeze your muscles as if you were trying not to pass gas. Or squeeze your muscles as if you were stopping the flow of urine. Your belly, legs, and buttocks shouldn't move.  Hold the squeeze for 3 seconds, then relax for 5 to 10 seconds.  Start with 3 seconds, then add 1 second each week until you are able to squeeze for 10 seconds.  Repeat the exercise 10 times a session. Do 3 to 8 sessions a day.  When should you call for help?  Watch closely for changes in your health, and be sure to contact your doctor if:    Your incontinence is getting worse.     You do not get better as expected.   Where can you learn more?  Go to https://www.[employer].net/patiented  Enter V684 in the search box to learn more about \"Bladder Training: Care Instructions.\"  Current as of: November 15, 2023  Content Version: 14.2 2024 [facility] Knowledge Factor.   Care instructions adapted under license by your healthcare professional. If you have questions about a medical condition or this instruction, always ask your healthcare professional. [employer] disclaims any warranty or liability for your use of this information.       "

## 2024-11-11 NOTE — ASSESSMENT & PLAN NOTE
She is due for an updated DEXA scan and we reviewed calcium and vitamin D recommendations along with weightbearing exercise.

## 2024-11-11 NOTE — PROGRESS NOTES
Preventive Care Visit  [facility]  KATIE ZUÑIGA MD, Family Medicine  Nov 11, 2024      Assessment & Plan   Problem List Items Addressed This Visit       Hyperlipidemia     Continues on atorvastatin 10 mg daily.         Relevant Orders    Comprehensive metabolic panel (BMP + Alb, Alk Phos, ALT, AST, Total. Bili, TP)    Lipid Profile (Chol, Trig, HDL, LDL calc)    Osteopenia     She is due for an updated DEXA scan and we reviewed calcium and vitamin D recommendations along with weightbearing exercise.         Relevant Orders    DX Bone Density    Vitamin D Deficiency    Atrophic vaginitis     She continues to find benefit from Premarin cream and a refill was provided today.         Family history of colon cancer     The patient has a family history of colon cancer.  Her last colonoscopy was in May 2023.  The gastroenterologist recommended a 5-year interval.  She question that today and wondered if 3 years would be a better interval.  I encouraged her to call [facility] and have the physician who performed the colonoscopy weigh in on her question.          Other Visit Diagnoses       Encounter for Medicare annual wellness exam    -  Primary    Postmenopausal status        Relevant Orders    DX Bone Density                  Counseling  Appropriate preventive services were addressed with this patient via screening, questionnaire, or discussion as appropriate for fall prevention, nutrition, physical activity, Tobacco-use cessation, social engagement, weight loss and cognition.  Checklist reviewing preventive services available has been given to the patient.  Reviewed patient's diet, addressing concerns and/or questions.   She is at risk for lack of exercise and has been provided with information to increase physical activity for the benefit of her well-being.   Information on urinary incontinence and treatment options given to patient.         Jael Vogel is a 64 year  old, presenting for the following:  Wellness Visit (/)        11/11/2024    10:40 AM   Additional Questions   Roomed by as   Accompanied by self         11/11/2024    10:40 AM   Patient Reported Additional Medications   Patient reports taking the following new medications no         Health Care Directive  Patient does not have a Health Care Directive: Patient states has Advance Directive and will bring in a copy to clinic.      11/11/2024   General Health   How would you rate your overall physical health? Good   Feel stress (tense, anxious, or unable to sleep) To some extent      (!) STRESS CONCERN      11/11/2024   Nutrition   Diet: Other   If other, please elaborate: vitamin D limitation            11/11/2024   Exercise   Days per week of moderate/strenous exercise 3 days            11/11/2024   Social Factors   Frequency of gathering with friends or relatives More than three times a week   Worry food won't last until get money to buy more No   Food not last or not have enough money for food? No   Do you have housing? (Housing is defined as stable permanent housing and does not include staying ouside in a car, in a tent, in an abandoned building, in an overnight shelter, or couch-surfing.) Yes   Are you worried about losing your housing? No   Lack of transportation? No   Unable to get utilities (heat,electricity)? No            11/11/2024   Fall Risk   Fallen 2 or more times in the past year? No     No    Trouble with walking or balance? No     No        Patient-reported    Multiple values from one day are sorted in reverse-chronological order          11/11/2024   Activities of Daily Living- Home Safety   Needs help with the following daily activites None of the above   Safety concerns in the home None of the above            11/11/2024   Dental   Dentist two times every year? Yes            11/11/2024   Hearing Screening   Hearing concerns? None of the above            11/11/2024   Driving Risk Screening    Patient/family members have concerns about driving No            11/11/2024   General Alertness/Fatigue Screening   Have you been more tired than usual lately? No            11/11/2024   Urinary Incontinence Screening   Bothered by leaking urine in past 6 months Yes            11/11/2024   TB Screening   Were you born outside of the [place]? No            Today's PHQ-2 Score:       11/11/2024    10:11 AM   PHQ-2 ( 1999 Pf[employer])   Q1: Little interest or pleasure in doing things 0    Q2: Feeling down, depressed or hopeless 0    PHQ-2 Score 0    Q1: Little interest or pleasure in doing things Not at all   Q2: Feeling down, depressed or hopeless Not at all   PHQ-2 Score 0       Patient-reported           11/11/2024   Substance Use   Alcohol more than 3/day or more than 7/wk No   Do you have a current opioid prescription? No   How severe/bad is pain from 1 to 10? 1/10   Do you use any other substances recreationally? No        Social History     Tobacco Use    Smoking status: Former    Smokeless tobacco: Never           8/30/2024   LAST FHS-7 RESULTS   1st degree relative breast or ovarian cancer No   Any relative bilateral breast cancer No   Any male have breast cancer No   Any ONE woman have BOTH breast AND ovarian cancer No   Any woman with breast cancer before 50yrs No   2 or more relatives with breast AND/OR ovarian cancer Yes   2 or more relatives with breast AND/OR bowel cancer Yes         Mammogram Screening - Mammogram every 1-2 years updated in Health Maintenance based on mutual decision making      History of abnormal Pap smear: No - age 65 or older with adequate negative prior screening test results (3 consecutive negative cytology results, 2 consecutive negative cotesting results, or 2 consecutive negative HrHPV test results within 10 years, with the most recent test occurring within the recommended screening interval for the test used)        Latest Ref Rng & Units 10/18/2023     3:51 PM 2/25/2019     9:02 AM  2014    12:39 PM   PAP / HPV   PAP  Negative for Intraepithelial Lesion or Malignancy (NILM)  Negative for squamous intraepithelial lesion or malignancy  Electronically signed by Tarsha Jean CT (ASCP) on 3/1/2019 at  8:43 AM    Negative for squamous intraepithelial lesion or malignancy  Electronically signed by Allison Padron CT (ASCP) on 2014 at  3:48 PM      HPV 16 DNA Negative Negative  Negative     HPV 18 DNA Negative Negative  Negative     Other HR HPV Negative Negative  Negative       ASCVD Risk   The 10-year ASCVD risk score (Kingsley CASTELLANOS, et al., 2019) is: 6.5%    Values used to calculate the score:      Age: 64 years      Sex: Female      Is Non- : No      Diabetic: No      Tobacco smoker: No      Systolic Blood Pressure: 138 mmHg      Is BP treated: No      HDL Cholesterol: 74 mg/dL      Total Cholesterol: 208 mg/dL      Reviewed and updated as needed this visit by Provider                    Patient Active Problem List   Diagnosis    Hyperlipidemia    Osteopenia    Atrophic vaginitis    Primary osteoarthritis of hands, bilateral    Family history of colon cancer    Bochdalek hernia    Meibomian gland dysfunction (MGD) of both eyes    Primary open angle glaucoma (POAG) of both eyes, mild stage    Vitreous syneresis of right eye     Past Surgical History:   Procedure Laterality Date    BUNIONECTOMY      bilateral feet     RELEASE CARPAL TUNNEL      x 2    RHINOPLASTY      ZZC  DELIVERY ONLY      Description:  Section;  Recorded: 2009;  Comments: X 2       Social History     Tobacco Use    Smoking status: Former    Smokeless tobacco: Never   Substance Use Topics    Alcohol use: Not on file     Family History   Problem Relation Age of Onset    Breast Cancer Maternal Aunt     Cancer Maternal Uncle     Cancer Maternal Uncle     Colon Cancer Sister 69.00        BRAF mutation    Breast Cancer Maternal Aunt     Breast Cancer Maternal  Aunt     Breast Cancer Maternal Aunt          Current Outpatient Medications   Medication Sig Dispense Refill    atorvastatin (LIPITOR) 10 MG tablet Take 1 tablet (10 mg) by mouth daily 90 tablet 3    conjugated estrogens (PREMARIN) 0.625 MG/GM vaginal cream Place 1 g vaginally twice a week. 30 g 3    fish oil-omega-3 fatty acids 1000 MG capsule       fluorouracil (EFUDEX) 5 % external cream       melatonin 5 MG CAPS       multivitamin  with lutein (OCUVITE WITH LUTEIN) CAPS per capsule  (Patient not taking: Reported on 11/11/2024)       No Known Allergies  Current providers sharing in care for this patient include:  Patient Care Team:  Jeny Baron MD as PCP - General (Family Practice)  Jeny Baron MD as Assigned PCP  Lacey Ortiz AuD as Audiologist (Audiology)  Elana Willson MD as MD (Otolaryngology)    The following health maintenance items are reviewed in Epic and correct as of today:  Health Maintenance   Topic Date Due    ASTHMA ACTION PLAN  Never done    RSV VACCINE (1 - Risk 60-74 years 1-dose series) Never done    COVID-19 Vaccine (5 - 2024-25 season) 09/01/2024    ANNUAL REVIEW OF HM ORDERS  10/18/2024    LIPID  12/13/2024    ASTHMA CONTROL TEST  05/11/2025    MAMMO SCREENING  08/30/2025    MEDICARE ANNUAL WELLNESS VISIT  11/11/2025    FALL RISK ASSESSMENT  11/11/2025    GLUCOSE  12/13/2026    COLORECTAL CANCER SCREENING  05/01/2028    DTAP/TDAP/TD IMMUNIZATION (3 - Td or Tdap) 02/25/2029    ADVANCE CARE PLANNING  11/11/2029    DEXA  09/26/2037    HEPATITIS C SCREENING  Completed    PHQ-2 (once per calendar year)  Completed    INFLUENZA VACCINE  Completed    Pneumococcal Vaccine: 65+ Years  Completed    ZOSTER IMMUNIZATION  Completed    HPV IMMUNIZATION  Aged Out    MENINGITIS IMMUNIZATION  Aged Out    RSV MONOCLONAL ANTIBODY  Aged Out    PAP  Discontinued    LUNG CANCER SCREENING  Discontinued          Objective    Exam  /84 (BP Location: Left arm, Patient  "Position: Left side, Cuff Size: Adult Regular)   Pulse 67   Temp 97.7  F (36.5  C) (Oral)   Resp 14   Ht 1.537 m (5' 0.5\")   Wt 53.1 kg (117 lb)   SpO2 95%   BMI 22.47 kg/m     Estimated body mass index is 22.47 kg/m  as calculated from the following:    Height as of this encounter: 1.537 m (5' 0.5\").    Weight as of this encounter: 53.1 kg (117 lb).    Physical Exam  GENERAL: alert and no distress  EYES: Eyes grossly normal to inspection, PERRL and conjunctivae and sclerae normal  HENT: ear canals and TM's normal, nose and mouth without ulcers or lesions  NECK: no adenopathy, no asymmetry, masses, or scars  RESP: lungs clear to auscultation - no rales, rhonchi or wheezes  BREAST: normal without masses, tenderness or nipple discharge and no palpable axillary masses or adenopathy  CV: regular rate and rhythm, normal S1 S2, no S3 or S4, no murmur, click or rub, no peripheral edema  ABDOMEN: soft, nontender, no hepatosplenomegaly, no masses and bowel sounds normal  MS: no gross musculoskeletal defects noted, no edema  SKIN: no suspicious lesions or rashes  NEURO: Normal strength and tone, mentation intact and speech normal  PSYCH: mentation appears normal, affect normal/bright         11/11/2024   Mini Cog   Clock Draw Score 0 Abnormal   3 Item Recall 3 objects recalled   Mini Cog Total Score 3                 Signed Electronically by: KATIE ZUÑIGA MD    "

## 2024-11-11 NOTE — TELEPHONE ENCOUNTER
Patient asking about an alternative to Premarin due to cost.  Pharmacy mentioned Estrace, patient asking if this would provide the same benefit as the Premarin?      Elana Olson RN  [facility]

## 2024-11-12 ENCOUNTER — PATIENT OUTREACH (OUTPATIENT)
Dept: CARE COORDINATION | Facility: CLINIC | Age: 64
End: 2024-11-12
Payer: COMMERCIAL

## 2024-11-12 LAB
ALBUMIN SERPL BCG-MCNC: 4.3 G/DL (ref 3.5–5.2)
ALP SERPL-CCNC: 74 U/L (ref 40–150)
ALT SERPL W P-5'-P-CCNC: 25 U/L (ref 0–50)
ANION GAP SERPL CALCULATED.3IONS-SCNC: 14 MMOL/L (ref 7–15)
AST SERPL W P-5'-P-CCNC: 28 U/L (ref 0–45)
BILIRUB SERPL-MCNC: 0.6 MG/DL
BUN SERPL-MCNC: 14 MG/DL (ref 8–23)
CALCIUM SERPL-MCNC: 9.5 MG/DL (ref 8.8–10.4)
CHLORIDE SERPL-SCNC: 105 MMOL/L (ref 98–107)
CHOLEST SERPL-MCNC: 186 MG/DL
CREAT SERPL-MCNC: 0.69 MG/DL (ref 0.51–0.95)
EGFRCR SERPLBLD CKD-EPI 2021: >90 ML/MIN/1.73M2
FASTING STATUS PATIENT QL REPORTED: YES
FASTING STATUS PATIENT QL REPORTED: YES
GLUCOSE SERPL-MCNC: 104 MG/DL (ref 70–99)
HCO3 SERPL-SCNC: 24 MMOL/L (ref 22–29)
HDLC SERPL-MCNC: 75 MG/DL
LDLC SERPL CALC-MCNC: 98 MG/DL
NONHDLC SERPL-MCNC: 111 MG/DL
POTASSIUM SERPL-SCNC: 4.3 MMOL/L (ref 3.4–5.3)
PROT SERPL-MCNC: 7 G/DL (ref 6.4–8.3)
SODIUM SERPL-SCNC: 143 MMOL/L (ref 135–145)
TRIGL SERPL-MCNC: 65 MG/DL
VIT D+METAB SERPL-MCNC: 44 NG/ML (ref 20–50)

## 2024-11-15 ENCOUNTER — TRANSFERRED RECORDS (OUTPATIENT)
Dept: HEALTH INFORMATION MANAGEMENT | Facility: CLINIC | Age: 64
End: 2024-11-15
Payer: COMMERCIAL

## 2024-12-09 ENCOUNTER — ANCILLARY PROCEDURE (OUTPATIENT)
Dept: BONE DENSITY | Facility: CLINIC | Age: 64
End: 2024-12-09
Attending: FAMILY MEDICINE
Payer: COMMERCIAL

## 2024-12-09 DIAGNOSIS — M85.80 OSTEOPENIA, UNSPECIFIED LOCATION: ICD-10-CM

## 2024-12-09 DIAGNOSIS — Z78.0 POSTMENOPAUSAL STATUS: ICD-10-CM

## 2024-12-09 PROCEDURE — 77080 DXA BONE DENSITY AXIAL: CPT | Mod: TC | Performed by: RADIOLOGY

## 2024-12-09 PROCEDURE — 77091 TBS TECHL CALCULATION ONLY: CPT | Performed by: RADIOLOGY

## 2025-02-15 ENCOUNTER — DOCUMENTATION ONLY (OUTPATIENT)
Dept: FAMILY MEDICINE | Facility: CLINIC | Age: 65
End: 2025-02-15
Payer: COMMERCIAL

## 2025-02-15 DIAGNOSIS — R73.9 HYPERGLYCEMIA: Primary | ICD-10-CM

## 2025-02-15 DIAGNOSIS — M85.80 OSTEOPENIA, UNSPECIFIED LOCATION: ICD-10-CM

## 2025-02-17 ENCOUNTER — MYC MEDICAL ADVICE (OUTPATIENT)
Dept: FAMILY MEDICINE | Facility: CLINIC | Age: 65
End: 2025-02-17
Payer: COMMERCIAL

## 2025-02-17 DIAGNOSIS — Z13.0 SCREENING, ANEMIA, DEFICIENCY, IRON: Primary | ICD-10-CM

## 2025-02-17 DIAGNOSIS — L65.9 HAIR LOSS: Primary | ICD-10-CM

## 2025-03-03 ENCOUNTER — LAB (OUTPATIENT)
Dept: LAB | Facility: CLINIC | Age: 65
End: 2025-03-03
Payer: COMMERCIAL

## 2025-03-03 DIAGNOSIS — R73.9 HYPERGLYCEMIA: ICD-10-CM

## 2025-03-03 DIAGNOSIS — L65.9 HAIR LOSS: ICD-10-CM

## 2025-03-03 DIAGNOSIS — Z13.0 SCREENING, ANEMIA, DEFICIENCY, IRON: ICD-10-CM

## 2025-03-03 LAB
ERYTHROCYTE [DISTWIDTH] IN BLOOD BY AUTOMATED COUNT: 12.6 % (ref 10–15)
EST. AVERAGE GLUCOSE BLD GHB EST-MCNC: 117 MG/DL
FERRITIN SERPL-MCNC: 160 NG/ML (ref 11–328)
HBA1C MFR BLD: 5.7 % (ref 0–5.6)
HCT VFR BLD AUTO: 42.4 % (ref 35–47)
HGB BLD-MCNC: 14 G/DL (ref 11.7–15.7)
MCH RBC QN AUTO: 31.5 PG (ref 26.5–33)
MCHC RBC AUTO-ENTMCNC: 33 G/DL (ref 31.5–36.5)
MCV RBC AUTO: 96 FL (ref 78–100)
PLATELET # BLD AUTO: 198 10E3/UL (ref 150–450)
RBC # BLD AUTO: 4.44 10E6/UL (ref 3.8–5.2)
TSH SERPL DL<=0.005 MIU/L-ACNC: 1.64 UIU/ML (ref 0.3–4.2)
WBC # BLD AUTO: 5 10E3/UL (ref 4–11)

## 2025-03-03 PROCEDURE — 36415 COLL VENOUS BLD VENIPUNCTURE: CPT

## 2025-03-03 PROCEDURE — 85027 COMPLETE CBC AUTOMATED: CPT

## 2025-03-03 PROCEDURE — 84443 ASSAY THYROID STIM HORMONE: CPT

## 2025-03-03 PROCEDURE — 82728 ASSAY OF FERRITIN: CPT

## 2025-03-03 PROCEDURE — 83036 HEMOGLOBIN GLYCOSYLATED A1C: CPT

## 2025-03-05 ENCOUNTER — MYC MEDICAL ADVICE (OUTPATIENT)
Dept: FAMILY MEDICINE | Facility: CLINIC | Age: 65
End: 2025-03-05
Payer: COMMERCIAL

## 2025-03-19 ENCOUNTER — TRANSFERRED RECORDS (OUTPATIENT)
Dept: HEALTH INFORMATION MANAGEMENT | Facility: CLINIC | Age: 65
End: 2025-03-19
Payer: COMMERCIAL

## 2025-05-13 ENCOUNTER — MYC MEDICAL ADVICE (OUTPATIENT)
Dept: FAMILY MEDICINE | Facility: CLINIC | Age: 65
End: 2025-05-13
Payer: COMMERCIAL

## 2025-07-31 ENCOUNTER — PATIENT OUTREACH (OUTPATIENT)
Dept: CARE COORDINATION | Facility: CLINIC | Age: 65
End: 2025-07-31
Payer: COMMERCIAL